# Patient Record
Sex: FEMALE | Race: WHITE | NOT HISPANIC OR LATINO | URBAN - METROPOLITAN AREA
[De-identification: names, ages, dates, MRNs, and addresses within clinical notes are randomized per-mention and may not be internally consistent; named-entity substitution may affect disease eponyms.]

---

## 2017-08-05 ENCOUNTER — EMERGENCY (EMERGENCY)
Facility: HOSPITAL | Age: 82
LOS: 1 days | Discharge: HOME | End: 2017-08-05
Admitting: EMERGENCY MEDICINE
Payer: MEDICARE

## 2017-08-05 ENCOUNTER — INPATIENT (INPATIENT)
Facility: HOSPITAL | Age: 82
LOS: 4 days | Discharge: HOME | DRG: 441 | End: 2017-08-10
Attending: INTERNAL MEDICINE | Admitting: INTERNAL MEDICINE
Payer: MEDICARE

## 2017-08-05 VITALS
OXYGEN SATURATION: 98 % | HEART RATE: 94 BPM | DIASTOLIC BLOOD PRESSURE: 63 MMHG | SYSTOLIC BLOOD PRESSURE: 104 MMHG | RESPIRATION RATE: 16 BRPM

## 2017-08-05 DIAGNOSIS — R17 UNSPECIFIED JAUNDICE: ICD-10-CM

## 2017-08-05 LAB
APPEARANCE UR: ABNORMAL
BACTERIA # UR AUTO: ABNORMAL /HPF
BILIRUB UR-MCNC: ABNORMAL
COLOR SPEC: YELLOW — SIGNIFICANT CHANGE UP
COMMENT - URINE: SIGNIFICANT CHANGE UP
DIFF PNL FLD: ABNORMAL
EPI CELLS # UR: SIGNIFICANT CHANGE UP /HPF
GLUCOSE UR QL: NEGATIVE — SIGNIFICANT CHANGE UP
KETONES UR-MCNC: NEGATIVE — SIGNIFICANT CHANGE UP
LEUKOCYTE ESTERASE UR-ACNC: ABNORMAL
NITRITE UR-MCNC: NEGATIVE — SIGNIFICANT CHANGE UP
PH UR: 6.5 — SIGNIFICANT CHANGE UP (ref 5–8)
PROT UR-MCNC: 30 MG/DL
RBC CASTS # UR COMP ASSIST: ABNORMAL /HPF (ref 0–2)
SP GR SPEC: >1.03 — HIGH (ref 1.01–1.02)
UROBILINOGEN FLD QL: 1
WBC UR QL: SIGNIFICANT CHANGE UP /HPF (ref 0–5)

## 2017-08-05 PROCEDURE — 85027 COMPLETE CBC AUTOMATED: CPT

## 2017-08-05 PROCEDURE — 80053 COMPREHEN METABOLIC PANEL: CPT

## 2017-08-05 PROCEDURE — 82550 ASSAY OF CK (CPK): CPT

## 2017-08-05 PROCEDURE — 99285 EMERGENCY DEPT VISIT HI MDM: CPT | Mod: 25

## 2017-08-05 PROCEDURE — 85610 PROTHROMBIN TIME: CPT

## 2017-08-05 PROCEDURE — 96360 HYDRATION IV INFUSION INIT: CPT | Mod: XU

## 2017-08-05 PROCEDURE — 82150 ASSAY OF AMYLASE: CPT

## 2017-08-05 PROCEDURE — 99285 EMERGENCY DEPT VISIT HI MDM: CPT

## 2017-08-05 PROCEDURE — 85730 THROMBOPLASTIN TIME PARTIAL: CPT

## 2017-08-05 PROCEDURE — 74177 CT ABD & PELVIS W/CONTRAST: CPT

## 2017-08-05 PROCEDURE — 86077 PHYS BLOOD BANK SERV XMATCH: CPT

## 2017-08-05 PROCEDURE — 71045 X-RAY EXAM CHEST 1 VIEW: CPT

## 2017-08-05 PROCEDURE — 71010: CPT | Mod: 26

## 2017-08-05 PROCEDURE — 93010 ELECTROCARDIOGRAM REPORT: CPT

## 2017-08-05 PROCEDURE — 74177 CT ABD & PELVIS W/CONTRAST: CPT | Mod: 26

## 2017-08-05 PROCEDURE — 84484 ASSAY OF TROPONIN QUANT: CPT

## 2017-08-05 PROCEDURE — 96361 HYDRATE IV INFUSION ADD-ON: CPT

## 2017-08-05 PROCEDURE — 82140 ASSAY OF AMMONIA: CPT

## 2017-08-05 PROCEDURE — 82553 CREATINE MB FRACTION: CPT

## 2017-08-05 PROCEDURE — 93005 ELECTROCARDIOGRAM TRACING: CPT

## 2017-08-05 PROCEDURE — 83690 ASSAY OF LIPASE: CPT

## 2017-08-05 RX ORDER — ENOXAPARIN SODIUM 100 MG/ML
40 INJECTION SUBCUTANEOUS DAILY
Qty: 0 | Refills: 0 | Status: DISCONTINUED | OUTPATIENT
Start: 2017-08-05 | End: 2017-08-05

## 2017-08-05 RX ORDER — CEFTRIAXONE 500 MG/1
1 INJECTION, POWDER, FOR SOLUTION INTRAMUSCULAR; INTRAVENOUS ONCE
Qty: 0 | Refills: 0 | Status: COMPLETED | OUTPATIENT
Start: 2017-08-05 | End: 2017-08-05

## 2017-08-05 RX ORDER — GABAPENTIN 400 MG/1
600 CAPSULE ORAL THREE TIMES A DAY
Qty: 0 | Refills: 0 | Status: DISCONTINUED | OUTPATIENT
Start: 2017-08-05 | End: 2017-08-10

## 2017-08-05 RX ORDER — HYDROMORPHONE HYDROCHLORIDE 2 MG/ML
0.5 INJECTION INTRAMUSCULAR; INTRAVENOUS; SUBCUTANEOUS THREE TIMES A DAY
Qty: 0 | Refills: 0 | Status: DISCONTINUED | OUTPATIENT
Start: 2017-08-05 | End: 2017-08-10

## 2017-08-05 RX ORDER — METOPROLOL TARTRATE 50 MG
25 TABLET ORAL
Qty: 0 | Refills: 0 | Status: DISCONTINUED | OUTPATIENT
Start: 2017-08-05 | End: 2017-08-10

## 2017-08-05 RX ORDER — ENOXAPARIN SODIUM 100 MG/ML
80 INJECTION SUBCUTANEOUS
Qty: 0 | Refills: 0 | Status: DISCONTINUED | OUTPATIENT
Start: 2017-08-06 | End: 2017-08-06

## 2017-08-05 RX ORDER — VALSARTAN 80 MG/1
40 TABLET ORAL DAILY
Qty: 0 | Refills: 0 | Status: DISCONTINUED | OUTPATIENT
Start: 2017-08-05 | End: 2017-08-10

## 2017-08-05 RX ORDER — SODIUM CHLORIDE 9 MG/ML
1000 INJECTION INTRAMUSCULAR; INTRAVENOUS; SUBCUTANEOUS
Qty: 0 | Refills: 0 | Status: DISCONTINUED | OUTPATIENT
Start: 2017-08-05 | End: 2017-08-06

## 2017-08-05 RX ORDER — PRAMIPEXOLE DIHYDROCHLORIDE 0.12 MG/1
0.25 TABLET ORAL AT BEDTIME
Qty: 0 | Refills: 0 | Status: DISCONTINUED | OUTPATIENT
Start: 2017-08-05 | End: 2017-08-10

## 2017-08-05 RX ADMIN — GABAPENTIN 600 MILLIGRAM(S): 400 CAPSULE ORAL at 23:36

## 2017-08-05 RX ADMIN — PRAMIPEXOLE DIHYDROCHLORIDE 0.25 MILLIGRAM(S): 0.12 TABLET ORAL at 23:36

## 2017-08-05 RX ADMIN — CEFTRIAXONE 100 GRAM(S): 500 INJECTION, POWDER, FOR SOLUTION INTRAMUSCULAR; INTRAVENOUS at 20:40

## 2017-08-05 RX ADMIN — SODIUM CHLORIDE 125 MILLILITER(S): 9 INJECTION INTRAMUSCULAR; INTRAVENOUS; SUBCUTANEOUS at 20:40

## 2017-08-05 RX ADMIN — SODIUM CHLORIDE 125 MILLILITER(S): 9 INJECTION INTRAMUSCULAR; INTRAVENOUS; SUBCUTANEOUS at 17:57

## 2017-08-05 NOTE — ED PROVIDER NOTE - NS ED ROS FT
Constitutional: No fever or chills  Eyes: No visual changes  HEENT: No throat pain  CV: No chest pain  Resp: No SOB no cough  GI: No abd pain, nausea or vomiting + diarrhea  : No dysuria  MSK: No musculoskeletal pain  Skin: + jaundice  Neuro: No headache + diffuse weakness.

## 2017-08-05 NOTE — CONSULT NOTE ADULT - ASSESSMENT
pt with chronic a.fib increase hr secondary undelying consition.  tele  will adjust meds.  tele  gi w/u  echo

## 2017-08-05 NOTE — ED PROVIDER NOTE - MEDICAL DECISION MAKING DETAILS
88F hx htn afib on pradaxa chf breast ca and lymphoma on oral chemo presents to the ED as a transfer from [place] for jaundice and diarrhea. Pt has had jaundice/diarrhea x 3 weeks. waited until today because  was sick and passed away yesterday. 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 transferred to see GI. Pt denies any pain at this time. Will obtain GI and admit for further work up. Avery Pink M.D., Attending Physician

## 2017-08-05 NOTE — H&P ADULT - NSHPPHYSICALEXAM_GEN_ALL_CORE
PHYSICAL EXAMINATION:  Vital Signs Last 24 Hrs  T(C): 36.4 (05 Aug 2017 17:00), Max: 36.4 (05 Aug 2017 17:00)  T(F): 97.5 (05 Aug 2017 17:00), Max: 97.5 (05 Aug 2017 17:00)  HR: 90 (05 Aug 2017 17:00) (90 - 94)  BP: 103/69 (05 Aug 2017 17:00) (103/69 - 104/63)  BP(mean): --  RR: 16 (05 Aug 2017 17:00) (16 - 16)  SpO2: 98% (05 Aug 2017 17:00) (98% - 98%)  CAPILLARY BLOOD GLUCOSE            GENERAL: NAD, well-groomed, well-developed  HEAD:  atraumatic, normocephalic  EYES: sclera anicteric  ENMT: mucous membranes moist  NECK: supple, No JVD  CHEST/LUNG: clear to auscultation bilaterally; no rales, rhonchi, or wheezing b/l  HEART: normal S1, S2  ABDOMEN: BS+, soft, ND, NT   EXTREMITIES:  pulses palpable; no clubbing, cyanosis, or edema b/l LEs  NEURO: awake, alert, interactive; moves all extremities  SKIN: no rashes or lesions

## 2017-08-05 NOTE — ED ADULT NURSE NOTE - OBJECTIVE STATEMENT
89 y/o female BIB EMS,  transfer from [place] for jaundice and diarrhea  x 3 weeks.  Pt waited until today because her  was sick and passed away yesterday. Pt seen at [facility].  Pt denies any pain at this time.  Pt denies fever, chills, vomiting, chest pain.  Respiration easy and non labored.  Skin warm and dry.

## 2017-08-05 NOTE — ED PROVIDER NOTE - PHYSICAL EXAMINATION
Constitutional: mild distress AAOx3  Eyes: PERRLA EOMI  Head: Normocephalic atraumatic  Mouth: dry mucous membranes  Cardiac: regular rate   Resp: Lungs CTAB  GI: Abd s/nt/nd + splenomegaly  Neuro: CN2-12 intact  Skin: No rashes + jaundice and scleral icteris

## 2017-08-05 NOTE — H&P ADULT - ASSESSMENT
PT  TRANSFERRED  FOR   JAUNDICE,  H/O  CA  BREAST, ,     CT  ABDOMEN,  RESULTS  PENDING,  DVT  PPX   NEEDS  MRCP/  GI PT  TRANSFERRED  FOR   JAUNDICE,  H/O  CA  BREAST, ,     CT  ABDOMEN,  RESULTS , with  biliary  nd d  pancreatic ductal dilatation, ,  DVT  PPX   NEEDS  MRCP/  GI,  hold  pradaxa,  lovenox  to  tart  in  am  for  dvt  ppx

## 2017-08-05 NOTE — CONSULT NOTE ADULT - SUBJECTIVE AND OBJECTIVE BOX
CHIEF COMPLAINT:Patient is a 88y old  Female who presents with a chief complaint of jaundice.consulted for a.fib with rvr.    HPI:· 88F hx htn afib on pradaxa chf breast ca and lymphoma on oral chemo presents to the ED as a transfer from [place] for jaundice and diarrhea. Pt has had jaundice/diarrhea x 3 weeks. waited until today because  was sick and passed away yesterday. 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 transferred to see GI. Pt denies any pain at this time.  pt denies of any chest pain.      PAST MEDICAL & SURGICAL HISTORY:      MEDICATIONS  (STANDING):  sodium chloride 0.9%. 1000 milliLiter(s) (125 mL/Hr) IV Continuous <Continuous>    MEDICATIONS  (PRN):      FAMILY HISTORY:      SOCIAL HISTORY:    [ ] Non-smoker  [ ] Smoker  [ ] Alcohol    Allergies    penicillins (Rash)    Intolerances    	    REVIEW OF SYSTEMS:  CONSTITUTIONAL: No fever, weight loss, + fatigue  EYES: No eye pain, visual disturbances, or discharge,+ jaundice  ENT:  No difficulty hearing, tinnitus, vertigo; No sinus or throat pain  NECK: No pain or stiffness  RESPIRATORY: No cough, wheezing, chills or hemoptysis; + exertional Shortness of Breath  CARDIOVASCULAR: No chest pain, palpitations, passing out, dizziness, or leg swelling  GASTROINTESTINAL: No abdominal or epigastric pain. No nausea, vomiting, or hematemesis; No diarrhea or constipation. No melena or hematochezia.  GENITOURINARY: No dysuria, frequency, hematuria, or incontinence  NEUROLOGICAL: No headaches, memory loss, loss of strength, numbness, or tremors  SKIN: No itching, burning, rashes, or lesions   LYMPH Nodes: No enlarged glands  ENDOCRINE: No heat or cold intolerance; No hair loss  MUSCULOSKELETAL: No joint pain or swelling; No muscle, back, or extremity pain  PSYCHIATRIC: No depression, anxiety, mood swings, or difficulty sleeping  HEME/LYMPH: No easy bruising, or bleeding gums  ALLERGY AND IMMUNOLOGIC: No hives or eczema	    [ ] All others negative	  [ ] Unable to obtain    PHYSICAL EXAM:  T(C): 36.4 (08-05-17 @ 17:00), Max: 36.4 (08-05-17 @ 17:00)  HR: 90 (08-05-17 @ 17:00) (90 - 94)  BP: 103/69 (08-05-17 @ 17:00) (103/69 - 104/63)  RR: 16 (08-05-17 @ 17:00) (16 - 16)  SpO2: 98% (08-05-17 @ 17:00) (98% - 98%)  Wt(kg): --  I&O's Summary      Appearance: Normal	  HEENT:   Normal oral mucosa, PERRL, EOMI, + jaundice	  Lymphatic: No lymphadenopathy  Cardiovascular: Normal S1 S2, No JVD, No murmurs, No edema  Respiratory: Lungs clear to auscultation	  Psychiatry: A & O x 3, Mood & affect appropriate  Gastrointestinal:  Soft, Non-tender, + BS	  Skin: No rashes, No ecchymoses, No cyanosis	  Neurologic: Non-focal  Extremities: Normal range of motion, No clubbing, cyanosis or edema  Vascular: Peripheral pulses palpable 2+ bilaterally    TELEMETRY: 	    ECG:  	  RADIOLOGY:  OTHER: 	  	  LABS:	 	    CARDIAC MARKERS:

## 2017-08-05 NOTE — H&P ADULT - HISTORY OF PRESENT ILLNESS
88F hx htn afib on pradaxa,  chf, ,  seen in [facility] breast ca and lymphoma on oral chemo presents to the ED as a transfer from [place] for jaundice and diarrhea. Pt has had jaundice/diarrhea x 3 weeks. waited until today because  was sick and passed away yesterday. 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 transferred to see GI. Pt denies any pain at this time.,  seen  in  [facility]

## 2017-08-05 NOTE — ED PROVIDER NOTE - OBJECTIVE STATEMENT
88F hx htn afib on pradaxa chf breast ca and lymphoma on oral chemo presents to the ED as a transfer from [place] for jaundice and diarrhea. Pt has had jaundice/diarrhea x 3 weeks. waited until today because  was sick and passed away yesterday. 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 transferred to see GI. Pt denies any pain at this time.

## 2017-08-06 DIAGNOSIS — N39.0 URINARY TRACT INFECTION, SITE NOT SPECIFIED: ICD-10-CM

## 2017-08-06 DIAGNOSIS — R06.00 DYSPNEA, UNSPECIFIED: ICD-10-CM

## 2017-08-06 DIAGNOSIS — R17 UNSPECIFIED JAUNDICE: ICD-10-CM

## 2017-08-06 LAB
ALBUMIN SERPL ELPH-MCNC: 2.9 G/DL — LOW (ref 3.3–5)
ALP SERPL-CCNC: 174 U/L — HIGH (ref 40–120)
ALT FLD-CCNC: 75 U/L — HIGH (ref 10–45)
ANION GAP SERPL CALC-SCNC: 16 MMOL/L — SIGNIFICANT CHANGE UP (ref 5–17)
AST SERPL-CCNC: 112 U/L — HIGH (ref 10–40)
BILIRUB DIRECT SERPL-MCNC: 11 MG/DL — HIGH (ref 0–0.2)
BILIRUB INDIRECT FLD-MCNC: 2.9 MG/DL — HIGH (ref 0.2–1)
BILIRUB SERPL-MCNC: 13.9 MG/DL — HIGH (ref 0.2–1.2)
BLD GP AB SCN SERPL QL: POSITIVE — SIGNIFICANT CHANGE UP
BUN SERPL-MCNC: 19 MG/DL — SIGNIFICANT CHANGE UP (ref 7–23)
CALCIUM SERPL-MCNC: 8 MG/DL — LOW (ref 8.4–10.5)
CHLORIDE SERPL-SCNC: 111 MMOL/L — HIGH (ref 96–108)
CO2 SERPL-SCNC: 11 MMOL/L — LOW (ref 22–31)
CREAT SERPL-MCNC: 0.88 MG/DL — SIGNIFICANT CHANGE UP (ref 0.5–1.3)
CULTURE RESULTS: SIGNIFICANT CHANGE UP
DAT POLY-SP REAG RBC QL: NEGATIVE — SIGNIFICANT CHANGE UP
GLUCOSE SERPL-MCNC: 129 MG/DL — HIGH (ref 70–99)
HCT VFR BLD CALC: 25.2 % — LOW (ref 34.5–45)
HCT VFR BLD CALC: 30.2 % — LOW (ref 34.5–45)
HGB BLD-MCNC: 10 G/DL — LOW (ref 11.5–15.5)
HGB BLD-MCNC: 7.8 G/DL — LOW (ref 11.5–15.5)
MCHC RBC-ENTMCNC: 27.9 PG — SIGNIFICANT CHANGE UP (ref 27–34)
MCHC RBC-ENTMCNC: 31 GM/DL — LOW (ref 32–36)
MCHC RBC-ENTMCNC: 31.1 PG — SIGNIFICANT CHANGE UP (ref 27–34)
MCHC RBC-ENTMCNC: 33.1 GM/DL — SIGNIFICANT CHANGE UP (ref 32–36)
MCV RBC AUTO: 90 FL — SIGNIFICANT CHANGE UP (ref 80–100)
MCV RBC AUTO: 93.9 FL — SIGNIFICANT CHANGE UP (ref 80–100)
NT-PROBNP SERPL-SCNC: 2478 PG/ML — HIGH (ref 0–300)
PLATELET # BLD AUTO: 130 K/UL — LOW (ref 150–400)
PLATELET # BLD AUTO: 138 K/UL — LOW (ref 150–400)
POTASSIUM SERPL-MCNC: 3.8 MMOL/L — SIGNIFICANT CHANGE UP (ref 3.5–5.3)
POTASSIUM SERPL-SCNC: 3.8 MMOL/L — SIGNIFICANT CHANGE UP (ref 3.5–5.3)
PROT SERPL-MCNC: 5.9 G/DL — LOW (ref 6–8.3)
RBC # BLD: 2.8 M/UL — LOW (ref 3.8–5.2)
RBC # BLD: 3.21 M/UL — LOW (ref 3.8–5.2)
RBC # FLD: 19.8 % — HIGH (ref 10.3–14.5)
RBC # FLD: 23 % — HIGH (ref 10.3–14.5)
RH IG SCN BLD-IMP: POSITIVE — SIGNIFICANT CHANGE UP
RH IG SCN BLD-IMP: POSITIVE — SIGNIFICANT CHANGE UP
SODIUM SERPL-SCNC: 138 MMOL/L — SIGNIFICANT CHANGE UP (ref 135–145)
SPECIMEN SOURCE: SIGNIFICANT CHANGE UP
TSH SERPL-MCNC: 3.12 UIU/ML — SIGNIFICANT CHANGE UP (ref 0.27–4.2)
WBC # BLD: 6.79 K/UL — SIGNIFICANT CHANGE UP (ref 3.8–10.5)
WBC # BLD: 9.2 K/UL — SIGNIFICANT CHANGE UP (ref 3.8–10.5)
WBC # FLD AUTO: 6.79 K/UL — SIGNIFICANT CHANGE UP (ref 3.8–10.5)
WBC # FLD AUTO: 9.2 K/UL — SIGNIFICANT CHANGE UP (ref 3.8–10.5)

## 2017-08-06 PROCEDURE — 99222 1ST HOSP IP/OBS MODERATE 55: CPT | Mod: GC

## 2017-08-06 RX ORDER — FUROSEMIDE 40 MG
40 TABLET ORAL DAILY
Qty: 0 | Refills: 0 | Status: DISCONTINUED | OUTPATIENT
Start: 2017-08-06 | End: 2017-08-10

## 2017-08-06 RX ORDER — FUROSEMIDE 40 MG
20 TABLET ORAL DAILY
Qty: 0 | Refills: 0 | Status: DISCONTINUED | OUTPATIENT
Start: 2017-08-06 | End: 2017-08-06

## 2017-08-06 RX ORDER — ENOXAPARIN SODIUM 100 MG/ML
60 INJECTION SUBCUTANEOUS
Qty: 0 | Refills: 0 | Status: DISCONTINUED | OUTPATIENT
Start: 2017-08-06 | End: 2017-08-10

## 2017-08-06 RX ORDER — TOREMIFENE CITRATE 60 MG/1
60 TABLET ORAL DAILY
Qty: 0 | Refills: 0 | Status: DISCONTINUED | OUTPATIENT
Start: 2017-08-06 | End: 2017-08-10

## 2017-08-06 RX ADMIN — GABAPENTIN 600 MILLIGRAM(S): 400 CAPSULE ORAL at 13:12

## 2017-08-06 RX ADMIN — SODIUM CHLORIDE 125 MILLILITER(S): 9 INJECTION INTRAMUSCULAR; INTRAVENOUS; SUBCUTANEOUS at 06:30

## 2017-08-06 RX ADMIN — GABAPENTIN 600 MILLIGRAM(S): 400 CAPSULE ORAL at 06:29

## 2017-08-06 RX ADMIN — GABAPENTIN 600 MILLIGRAM(S): 400 CAPSULE ORAL at 22:00

## 2017-08-06 RX ADMIN — Medication 40 MILLIGRAM(S): at 14:16

## 2017-08-06 RX ADMIN — Medication 5 MILLIGRAM(S): at 06:29

## 2017-08-06 RX ADMIN — Medication 25 MILLIGRAM(S): at 06:29

## 2017-08-06 RX ADMIN — Medication 25 MILLIGRAM(S): at 17:37

## 2017-08-06 RX ADMIN — PRAMIPEXOLE DIHYDROCHLORIDE 0.25 MILLIGRAM(S): 0.12 TABLET ORAL at 22:00

## 2017-08-06 RX ADMIN — ENOXAPARIN SODIUM 60 MILLIGRAM(S): 100 INJECTION SUBCUTANEOUS at 17:37

## 2017-08-06 RX ADMIN — VALSARTAN 40 MILLIGRAM(S): 80 TABLET ORAL at 06:29

## 2017-08-06 NOTE — PROGRESS NOTE ADULT - ASSESSMENT
PT WITH JAUNDICE    CT WITH DUCTAL DILATATION   HOUSE GI TO SEE PT.  H/O OF METASTATIC  CANCER    AWAITING MRCP    ON LOVENOX  BID, NEEDS TO  BE  HELD PRIOR  TO  ERCP

## 2017-08-06 NOTE — CONSULT NOTE ADULT - SUBJECTIVE AND OBJECTIVE BOX
Patient is a 88y old  Female who presents with a chief complaint of jaundice (05 Aug 2017 18:30)    HPI:  88F hx htn afib on pradaxa,  chf, breast ca and lymphoma on oral chemo presents to the ED as a transfer from [place] for jaundice and diarrhea. Pt has had jaundice/diarrhea x 3 weeks. waited until today because  was sick and passed away yesterday. 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 Transferred to see GI. Pt denies any pain at this time.,  seen  in  er (05 Aug 2017 18:30)      PAST MEDICAL & SURGICAL HISTORY:      Social history:  Non smoker    FAMILY HISTORY:  Non contributory    Allergies    penicillins (Rash)    Intolerances        Antimicrobials:    levoFLOXacin  Tablet 250 milliGRAM(s) Oral every 24 hours        Vital Signs Last 24 Hrs  T(C): 36.3 (06 Aug 2017 04:46), Max: 36.9 (05 Aug 2017 22:00)  T(F): 97.4 (06 Aug 2017 04:46), Max: 98.4 (05 Aug 2017 22:00)  HR: 95 (06 Aug 2017 04:46) (88 - 95)  BP: 115/71 (06 Aug 2017 04:46) (103/69 - 116/77)  BP(mean): --  RR: 18 (06 Aug 2017 04:46) (16 - 18)  SpO2: 99% (06 Aug 2017 04:46) (97% - 100%)                              7.8    6.79  )-----------( 138      ( 06 Aug 2017 06:13 )             25.2         08-06    138  |  111<H>  |  19  ----------------------------<  129<H>  3.8   |  11<L>  |  0.88    Ca    8.0<L>      06 Aug 2017 09:02    TPro  5.9<L>  /  Alb  2.9<L>  /  TBili  13.9<H>  /  DBili  x   /  AST  112<H>  /  ALT  75<H>  /  AlkPhos  174<H>  08-06      RECENT CULTURES:    Patient is a 88y old  Female who presents with a chief complaint of jaundice (05 Aug 2017 18:30)    HPI:  88F hx htn afib on pradaxa,  chf, ,  seen in [facility] breast ca and lymphoma on oral chemo presents to the ED as a transfer from [place] for jaundice and diarrhea. Pt has had jaundice/diarrhea x 3 weeks. waited until today because  was sick and passed away yesterday. 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 transferred to see GI. Pt denies any pain at this time.,  seen  in  er (05 Aug 2017 18:30)      PAST MEDICAL & SURGICAL HISTORY:      Social history:    FAMILY HISTORY:    Allergies    penicillins (Rash)    Intolerances        Antimicrobials:    levoFLOXacin  Tablet 250 milliGRAM(s) Oral every 24 hours        Vital Signs Last 24 Hrs  T(C): 36.3 (06 Aug 2017 04:46), Max: 36.9 (05 Aug 2017 22:00)  T(F): 97.4 (06 Aug 2017 04:46), Max: 98.4 (05 Aug 2017 22:00)  HR: 95 (06 Aug 2017 04:46) (88 - 95)  BP: 115/71 (06 Aug 2017 04:46) (103/69 - 116/77)  BP(mean): --  RR: 18 (06 Aug 2017 04:46) (16 - 18)  SpO2: 99% (06 Aug 2017 04:46) (97% - 100%)                              7.8    6.79  )-----------( 138      ( 06 Aug 2017 06:13 )             25.2         08-06    138  |  111<H>  |  19  ----------------------------<  129<H>  3.8   |  11<L>  |  0.88    Ca    8.0<L>      06 Aug 2017 09:02    TPro  5.9<L>  /  Alb  2.9<L>  /  TBili  13.9<H>  /  DBili  x   /  AST  112<H>  /  ALT  75<H>  /  AlkPhos  174<H>  08-06      RECENT CULTURES: Patient is a 88y old  Female who presents with a chief complaint of jaundice (05 Aug 2017 18:30)    HPI:  88F hx htn afib on pradaxa,  chf, breast ca and lymphoma on oral chemo presents to the ED as a transfer from [place] for jaundice and diarrhea. Pt has had jaundice/diarrhea x 3 weeks. Diarrhea has now resolved. Pt waited until yesterday to come to [facility], because  was sick and passed away yesterday. 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 Transferred to see GI and MRCP. Pt denies any pain at this time.     Pt with prior hx of breast cancer on [facility] for ca breast. 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 Details unclear about the status of ca breast. also a year ago was diagnosed to have lymphoma for which she has not received any treatment.     For several months pt reports urinary frequency and incontinence. Denies any fever or chills. No abdominal pain. Since her admission to hospital reports difficulty breathing.       PAST MEDICAL & SURGICAL HISTORY:  Ca breast  Lymphoma  A fib on pradaxa      Social history:  Non smoker    FAMILY HISTORY:  Non contributory    Allergies    penicillins (Rash)    Intolerances        Antimicrobials:    levoFLOXacin  Tablet 250 milliGRAM(s) Oral every 24 hours        Vital Signs Last 24 Hrs  T(C): 36.3 (06 Aug 2017 04:46), Max: 36.9 (05 Aug 2017 22:00)  T(F): 97.4 (06 Aug 2017 04:46), Max: 98.4 (05 Aug 2017 22:00)  HR: 95 (06 Aug 2017 04:46) (88 - 95)  BP: 115/71 (06 Aug 2017 04:46) (103/69 - 116/77)  BP(mean): --  RR: 18 (06 Aug 2017 04:46) (16 - 18)  SpO2: 99% (06 Aug 2017 04:46) (97% - 100%)                              7.8    6.79  )-----------( 138      ( 06 Aug 2017 06:13 )             25.2         08-06    138  |  111<H>  |  19  ----------------------------<  129<H>  3.8   |  11<L>  |  0.88    Ca    8.0<L>      06 Aug 2017 09:02    TPro  5.9<L>  /  Alb  2.9<L>  /  TBili  13.9<H>  /  DBili  x   /  AST  112<H>  /  ALT  75<H>  /  AlkPhos  174<H>  08-06      RECENT CULTURES:  Urine culture: In lab Patient is a 88y old  Female who presents with a chief complaint of jaundice (05 Aug 2017 18:30)    HPI:  88F hx htn afib on pradaxa,  chf, breast ca and lymphoma on oral chemo presents to the ED as a transfer from [place] for jaundice and diarrhea. Pt has had jaundice/diarrhea x 3 weeks. Diarrhea has now resolved. Pt waited until yesterday to come to [facility], because  was sick and passed away more than a week ago. 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 Transferred to see GI and MRCP. Pt denies any pain at this time.     Pt with prior hx of breast cancer on [facility] for ca breast. 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 Details unclear about the status of ca breast. also a year ago was diagnosed to have lymphoma for which she has not received any treatment.     For several months pt reports urinary frequency and incontinence. Denies any fever or chills. No abdominal pain. Since her admission to hospital reports difficulty breathing.       PAST MEDICAL & SURGICAL HISTORY:  Ca breast  Lymphoma  A fib on pradaxa      Social history:  Non smoker    FAMILY HISTORY:  Non contributory    Allergies    penicillins (Rash)    Intolerances        Antimicrobials:    levoFLOXacin  Tablet 250 milliGRAM(s) Oral every 24 hours        Vital Signs Last 24 Hrs  T(C): 36.3 (06 Aug 2017 04:46), Max: 36.9 (05 Aug 2017 22:00)  T(F): 97.4 (06 Aug 2017 04:46), Max: 98.4 (05 Aug 2017 22:00)  HR: 95 (06 Aug 2017 04:46) (88 - 95)  BP: 115/71 (06 Aug 2017 04:46) (103/69 - 116/77)  BP(mean): --  RR: 18 (06 Aug 2017 04:46) (16 - 18)  SpO2: 99% (06 Aug 2017 04:46) (97% - 100%)                              7.8    6.79  )-----------( 138      ( 06 Aug 2017 06:13 )             25.2         08-06    138  |  111<H>  |  19  ----------------------------<  129<H>  3.8   |  11<L>  |  0.88    Ca    8.0<L>      06 Aug 2017 09:02    TPro  5.9<L>  /  Alb  2.9<L>  /  TBili  13.9<H>  /  DBili  x   /  AST  112<H>  /  ALT  75<H>  /  AlkPhos  174<H>  08-06      RECENT CULTURES:  Urine culture: In lab Patient is a 88y old  Female who presents with a chief complaint of jaundice (05 Aug 2017 18:30)    HPI:  88F hx htn afib on pradaxa,  chf, breast ca and lymphoma on oral chemo presents to the ED as a transfer from [place] for jaundice and diarrhea. Pt has had jaundice/diarrhea x 3 weeks. Diarrhea has now resolved. Pt waited until yesterday to come to [facility], because  was sick and passed away more than a week ago. 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 Transferred to see GI and MRCP. Pt denies any pain at this time.     Pt with prior hx of breast cancer on [facility] for ca breast. 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 Details unclear about the status of ca breast. also a year ago was diagnosed to have lymphoma for which she has not received any treatment.     For several months pt reports urinary frequency and incontinence. Denies any fever or chills. No abdominal pain. Since her admission to hospital reports difficulty breathing.       PAST MEDICAL & SURGICAL HISTORY:  Ca breast  Lymphoma  A fib on pradaxa      Social history:  Non smoker    FAMILY HISTORY:  Non contributory    Allergies    penicillins (Rash)    Intolerances        Antimicrobials:    levoFLOXacin  Tablet 250 milliGRAM(s) Oral every 24 hours        Vital Signs Last 24 Hrs  T(C): 36.3 (06 Aug 2017 04:46), Max: 36.9 (05 Aug 2017 22:00)  T(F): 97.4 (06 Aug 2017 04:46), Max: 98.4 (05 Aug 2017 22:00)  HR: 95 (06 Aug 2017 04:46) (88 - 95)  BP: 115/71 (06 Aug 2017 04:46) (103/69 - 116/77)  BP(mean): --  RR: 18 (06 Aug 2017 04:46) (16 - 18)  SpO2: 99% (06 Aug 2017 04:46) (97% - 100%)                              7.8    6.79  )-----------( 138      ( 06 Aug 2017 06:13 )             25.2         08-06    138  |  111<H>  |  19  ----------------------------<  129<H>  3.8   |  11<L>  |  0.88    Ca    8.0<L>      06 Aug 2017 09:02    TPro  5.9<L>  /  Alb  2.9<L>  /  TBili  13.9<H>  /  DBili  x   /  AST  112<H>  /  ALT  75<H>  /  AlkPhos  174<H>  08-06      RECENT CULTURES:  Urine culture: In lab      RADIOLOGY  EXAM: CT ABDOMEN AND PELVIS OC IC  There is moderate cardiomegaly. A right breast implant is noted.  There is moderately severe intra and extrahepatic biliary ductal dilatation.  Common bile duct measures a maximum of roughly 15 mm and is distended to the  ampulla. The gallbladder is also severely distended. The pancreatic duct is  severely distended with moderate pancreatic parenchymal atrophy. There is no  calcified intraductal stone or pancreatic or duodenal mass or surrounding  fat stranding. The stomach is contracted. The hepatic parenchyma contains  several tiny peripheral cysts without enhancing mass lesion.    The spleen is enlarged, measuring approximately 15 cm in length. The  adrenals and appendix are unremarkable. There are tiny renal cysts. There is  no bowel dilatation or ascites or adenopathy or focal inflammation of the  peritoneal fat. Uterus is surgically absent. A pessary is in place. The  urinary bladder is contracted.    Incidental note is made of vertebral plana of L3 with additional chronic  compression fractures elsewhere.    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 Endoscopy suggested. Splenomegaly. Patient is a 88y old  Female who presents with a chief complaint of jaundice (05 Aug 2017 18:30)    HPI:  88F hx htn afib on pradaxa,  chf, breast ca and lymphoma on oral chemo presents to the ED as a transfer from [place] for jaundice and diarrhea. Pt has had jaundice/diarrhea x 3 weeks. Diarrhea has now resolved. Pt waited until yesterday to come to [facility], because  was sick and passed away more than a week ago. 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 Transferred to see GI and MRCP. Pt denies any pain at this time.     Pt with prior hx of breast cancer on [facility] for ca breast. 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 Details unclear about the status of ca breast. also a year ago was diagnosed to have lymphoma for which she has not received any treatment.     For several months pt reports urinary frequency and incontinence. Denies any fever or chills. No abdominal pain. Since her admission to hospital reports difficulty breathing.       PAST MEDICAL & SURGICAL HISTORY:  Ca breast  Lymphoma  A fib on pradaxa      Social history:  Non smoker    FAMILY HISTORY:  Non contributory    Allergies    penicillins (Rash)    Intolerances        Antimicrobials:    levoFLOXacin  Tablet 250 milliGRAM(s) Oral every 24 hours        Vital Signs Last 24 Hrs  T(C): 36.3 (06 Aug 2017 04:46), Max: 36.9 (05 Aug 2017 22:00)  T(F): 97.4 (06 Aug 2017 04:46), Max: 98.4 (05 Aug 2017 22:00)  HR: 95 (06 Aug 2017 04:46) (88 - 95)  BP: 115/71 (06 Aug 2017 04:46) (103/69 - 116/77)  BP(mean): --  RR: 18 (06 Aug 2017 04:46) (16 - 18)  SpO2: 99% (06 Aug 2017 04:46) (97% - 100%)                              7.8    6.79  )-----------( 138      ( 06 Aug 2017 06:13 )             25.2         08-06    138  |  111<H>  |  19  ----------------------------<  129<H>  3.8   |  11<L>  |  0.88    Ca    8.0<L>      06 Aug 2017 09:02    TPro  5.9<L>  /  Alb  2.9<L>  /  TBili  13.9<H>  /  DBili  x   /  AST  112<H>  /  ALT  75<H>  /  AlkPhos  174<H>  08-06      RECENT CULTURES:  Urine culture: In lab      RADIOLOGY  EXAM: CT ABDOMEN AND PELVIS OC IC  There is moderate cardiomegaly. A right breast implant is noted.  There is moderately severe intra and extrahepatic biliary ductal dilatation.  Common bile duct measures a maximum of roughly 15 mm and is distended to the  ampulla. The gallbladder is also severely distended. The pancreatic duct is  severely distended with moderate pancreatic parenchymal atrophy. There is no  calcified intraductal stone or pancreatic or duodenal mass or surrounding  fat stranding. The stomach is contracted. The hepatic parenchyma contains  several tiny peripheral cysts without enhancing mass lesion.    The spleen is enlarged, measuring approximately 15 cm in length. The  adrenals and appendix are unremarkable. There are tiny renal cysts. There is  no bowel dilatation or ascites or adenopathy or focal inflammation of the  peritoneal fat. Uterus is surgically absent. A pessary is in place. The  urinary bladder is contracted.    Incidental note is made of vertebral plana of L3 with additional chronic  compression fractures elsewhere.    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 Endoscopy suggested. Splenomegaly. Patient is a 88y old  Female who presents with a chief complaint of jaundice (05 Aug 2017 18:30)    HPI:  88F hx htn afib on pradaxa,  chf, breast ca on oral chemo and lymphoma ( s/p rituxan in past, stopped 2/2 shingled) presents to the ED as a transfer from [facility] for jaundice and diarrhea. Pt has had jaundice/diarrhea x 3 weeks. Diarrhea has now resolved. Pt waited until yesterday to come for eval, because  was sick and passed away more than a week ago. 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 Transferred to see GI and MRCP. Pt denies any pain at this time.     Pt with prior hx of breast cancer on [facility] for ca breast. Per pt her disease has spread to bones but stable. Unclear about the status of ca breast. Also more than a year ago was diagnosed to have lymphoma for which she has was given rituxan initially which was later discontinued 2/2 shingles.    For several months pt reports urinary frequency and incontinence. Denies any fever or chills. No abdominal pain. Since her admission to hospital reports difficulty breathing.       PAST MEDICAL & SURGICAL HISTORY:  Ca breast  Lymphoma  A fib on pradaxa  CHF      Social history:  Non smoker    FAMILY HISTORY:  Non contributory    Allergies    penicillins (Rash)    Intolerances        Antimicrobials:    levoFLOXacin  Tablet 250 milliGRAM(s) Oral every 24 hours        Vital Signs Last 24 Hrs  T(C): 36.3 (06 Aug 2017 04:46), Max: 36.9 (05 Aug 2017 22:00)  T(F): 97.4 (06 Aug 2017 04:46), Max: 98.4 (05 Aug 2017 22:00)  HR: 95 (06 Aug 2017 04:46) (88 - 95)  BP: 115/71 (06 Aug 2017 04:46) (103/69 - 116/77)  BP(mean): --  RR: 18 (06 Aug 2017 04:46) (16 - 18)  SpO2: 99% (06 Aug 2017 04:46) (97% - 100%)                              7.8    6.79  )-----------( 138      ( 06 Aug 2017 06:13 )             25.2         08-06    138  |  111<H>  |  19  ----------------------------<  129<H>  3.8   |  11<L>  |  0.88    Ca    8.0<L>      06 Aug 2017 09:02    TPro  5.9<L>  /  Alb  2.9<L>  /  TBili  13.9<H>  /  DBili  x   /  AST  112<H>  /  ALT  75<H>  /  AlkPhos  174<H>  08-06      RECENT CULTURES:  Urine culture: In lab      RADIOLOGY  EXAM: CT ABDOMEN AND PELVIS OC IC  There is moderate cardiomegaly. A right breast implant is noted.  There is moderately severe intra and extrahepatic biliary ductal dilatation.  Common bile duct measures a maximum of roughly 15 mm and is distended to the  ampulla. The gallbladder is also severely distended. The pancreatic duct is  severely distended with moderate pancreatic parenchymal atrophy. There is no  calcified intraductal stone or pancreatic or duodenal mass or surrounding  fat stranding. The stomach is contracted. The hepatic parenchyma contains  several tiny peripheral cysts without enhancing mass lesion.    The spleen is enlarged, measuring approximately 15 cm in length. The  adrenals and appendix are unremarkable. There are tiny renal cysts. There is  no bowel dilatation or ascites or adenopathy or focal inflammation of the  peritoneal fat. Uterus is surgically absent. A pessary is in place. The  urinary bladder is contracted.    Incidental note is made of vertebral plana of L3 with additional chronic  compression fractures elsewhere.    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 Endoscopy suggested. Splenomegaly. Patient is a 88y old  Female who presents with a chief complaint of jaundice (05 Aug 2017 18:30)    HPI:  88F hx htn afib on pradaxa,  chf, breast ca on oral chemo and lymphoma ( s/p rituxan in past, stopped 2/2 shingles) presents to the ED as a transfer from [place] for jaundice and diarrhea. Pt has had jaundice/diarrhea x 3 weeks. Diarrhea has now resolved. Pt waited until yesterday to come for eval, because  was sick and passed away more than a week ago. 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 Transferred to see GI and MRCP. Pt denies any pain at this time.     Pt with prior hx of breast cancer on [facility] for ca breast. Per pt her disease has spread to bones but stable. Unclear about the status of ca breast. Also more than a year ago was diagnosed to have lymphoma for which she has was given rituxan initially which was later discontinued 2/2 shingles. Reports lymphoma is also stable.     For several months pt reports urinary frequency and incontinence. Denies any fever or chills. No abdominal pain. Since her admission to hospital reports difficulty breathing.       PAST MEDICAL & SURGICAL HISTORY:  Ca breast  Lymphoma  A fib on pradaxa  CHF      Social history:  Non smoker    FAMILY HISTORY:  Non contributory    Allergies    penicillins (Rash)    Intolerances        Antimicrobials:    levoFLOXacin  Tablet 250 milliGRAM(s) Oral every 24 hours        Vital Signs Last 24 Hrs  T(C): 36.3 (06 Aug 2017 04:46), Max: 36.9 (05 Aug 2017 22:00)  T(F): 97.4 (06 Aug 2017 04:46), Max: 98.4 (05 Aug 2017 22:00)  HR: 95 (06 Aug 2017 04:46) (88 - 95)  BP: 115/71 (06 Aug 2017 04:46) (103/69 - 116/77)  BP(mean): --  RR: 18 (06 Aug 2017 04:46) (16 - 18)  SpO2: 99% (06 Aug 2017 04:46) (97% - 100%)                              7.8    6.79  )-----------( 138      ( 06 Aug 2017 06:13 )             25.2         08-06    138  |  111<H>  |  19  ----------------------------<  129<H>  3.8   |  11<L>  |  0.88    Ca    8.0<L>      06 Aug 2017 09:02    TPro  5.9<L>  /  Alb  2.9<L>  /  TBili  13.9<H>  /  DBili  x   /  AST  112<H>  /  ALT  75<H>  /  AlkPhos  174<H>  08-06      RECENT CULTURES:  Urine culture: In lab      RADIOLOGY  EXAM: CT ABDOMEN AND PELVIS OC IC  There is moderate cardiomegaly. A right breast implant is noted.  There is moderately severe intra and extrahepatic biliary ductal dilatation.  Common bile duct measures a maximum of roughly 15 mm and is distended to the  ampulla. The gallbladder is also severely distended. The pancreatic duct is  severely distended with moderate pancreatic parenchymal atrophy. There is no  calcified intraductal stone or pancreatic or duodenal mass or surrounding  fat stranding. The stomach is contracted. The hepatic parenchyma contains  several tiny peripheral cysts without enhancing mass lesion.    The spleen is enlarged, measuring approximately 15 cm in length. The  adrenals and appendix are unremarkable. There are tiny renal cysts. There is  no bowel dilatation or ascites or adenopathy or focal inflammation of the  peritoneal fat. Uterus is surgically absent. A pessary is in place. The  urinary bladder is contracted.    Incidental note is made of vertebral plana of L3 with additional chronic  compression fractures elsewhere.    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 Endoscopy suggested. Splenomegaly. Patient is a 88y old  Female who presents with a chief complaint of jaundice (05 Aug 2017 18:30)    HPI:  88F hx htn afib on pradaxa,  chf, breast ca on oral chemo and lymphoma ( s/p rituxan in past, stopped 2/2 shingles) presents to the ED as a transfer from [place] for jaundice and diarrhea. Pt has had jaundice/diarrhea x 3 weeks. Diarrhea has now resolved. Pt waited until yesterday to come for eval, because  was sick and passed away more than a week ago. 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 Transferred to see GI and MRCP. Pt denies any pain at this time.     Pt with prior hx of breast cancer ( >40 yrs ago, s/p mastectomy and chemo) now on oral chemo [place]. Per pt her disease has spread to bones but stable. Unclear about the status of ca breast. Also more than a year ago was diagnosed to have lymphoma for which she has was given rituxan initially which was later discontinued 2/2 shingles. Reports lymphoma is also stable.     For several months pt reports urinary frequency and incontinence. Denies any fever or chills. No abdominal pain. Since her admission to hospital reports difficulty breathing.       PAST MEDICAL & SURGICAL HISTORY:  Ca breast  Lymphoma  A fib on pradaxa  CHF      Social history:  Non smoker    FAMILY HISTORY:  Non contributory    Allergies    penicillins (Rash)    Intolerances        Antimicrobials:    levoFLOXacin  Tablet 250 milliGRAM(s) Oral every 24 hours        Vital Signs Last 24 Hrs  T(C): 36.3 (06 Aug 2017 04:46), Max: 36.9 (05 Aug 2017 22:00)  T(F): 97.4 (06 Aug 2017 04:46), Max: 98.4 (05 Aug 2017 22:00)  HR: 95 (06 Aug 2017 04:46) (88 - 95)  BP: 115/71 (06 Aug 2017 04:46) (103/69 - 116/77)  BP(mean): --  RR: 18 (06 Aug 2017 04:46) (16 - 18)  SpO2: 99% (06 Aug 2017 04:46) (97% - 100%)                              7.8    6.79  )-----------( 138      ( 06 Aug 2017 06:13 )             25.2         08-06    138  |  111<H>  |  19  ----------------------------<  129<H>  3.8   |  11<L>  |  0.88    Ca    8.0<L>      06 Aug 2017 09:02    TPro  5.9<L>  /  Alb  2.9<L>  /  TBili  13.9<H>  /  DBili  x   /  AST  112<H>  /  ALT  75<H>  /  AlkPhos  174<H>  08-06      RECENT CULTURES:  Urine culture: In lab      RADIOLOGY  EXAM: CT ABDOMEN AND PELVIS OC IC  There is moderate cardiomegaly. A right breast implant is noted.  There is moderately severe intra and extrahepatic biliary ductal dilatation.  Common bile duct measures a maximum of roughly 15 mm and is distended to the  ampulla. The gallbladder is also severely distended. The pancreatic duct is  severely distended with moderate pancreatic parenchymal atrophy. There is no  calcified intraductal stone or pancreatic or duodenal mass or surrounding  fat stranding. The stomach is contracted. The hepatic parenchyma contains  several tiny peripheral cysts without enhancing mass lesion.    The spleen is enlarged, measuring approximately 15 cm in length. The  adrenals and appendix are unremarkable. There are tiny renal cysts. There is  no bowel dilatation or ascites or adenopathy or focal inflammation of the  peritoneal fat. Uterus is surgically absent. A pessary is in place. The  urinary bladder is contracted.    Incidental note is made of vertebral plana of L3 with additional chronic  compression fractures elsewhere.    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 Endoscopy suggested. Splenomegaly.

## 2017-08-06 NOTE — CONSULT NOTE ADULT - ATTENDING COMMENTS
Patient seen and examined  Above verified   No s/s clinically of UTI  jaundice,ampullary stricture ?Ca  Will defer to primary team  Observe off antimicrobials  Tailor plan per results,course  Case d/w Dr hodge  Will Follow.  Beeper [id number]-slaa/afterhours/No response-[id number]

## 2017-08-06 NOTE — PROGRESS NOTE ADULT - SUBJECTIVE AND OBJECTIVE BOX
SUBJECTIVE / OVERNIGHT EVENTS: No nausea, vomiting or diarrhea, no fever or chills, no dizziness or chest pain, no dysuria or hematuria .      CAPILLARY BLOOD GLUCOSE        I&O's Summary    05 Aug 2017 07:01  -  06 Aug 2017 07:00  --------------------------------------------------------  IN: 875 mL / OUT: 0 mL / NET: 875 mL        PHYSICAL EXAM:  jaundice  HEAD:  Atraumatic, Normocephalic  EYES: EOMI, PERRLA, conjunctiva and sclera clear  NECK: Supple, No JVD  CHEST/LUNG: Clear to auscultation bilaterally; No wheeze  HEART: Regular rate and rhythm; No murmurs, rubs, or gallops  ABDOMEN: Soft, Nontender, Nondistended; Bowel sounds present  EXTREMITIES:  2+ Peripheral Pulses, No clubbing, cyanosis, or edema  PSYCH: AAOx3  NEUROLOGY: non-focal  SKIN: No rashes or lesions    MEDICATIONS  (STANDING):  sodium chloride 0.9%. 1000 milliLiter(s) (125 mL/Hr) IV Continuous <Continuous>  enoxaparin Injectable 80 milliGRAM(s) SubCutaneous two times a day  metoprolol 25 milliGRAM(s) Oral two times a day  valsartan 40 milliGRAM(s) Oral daily  gabapentin 600 milliGRAM(s) Oral three times a day  torsemide 5 milliGRAM(s) Oral daily  pramipexole 0.25 milliGRAM(s) Oral at bedtime  levoFLOXacin  Tablet 250 milliGRAM(s) Oral every 24 hours    MEDICATIONS  (PRN):  HYDROmorphone  Injectable 0.5 milliGRAM(s) IV Push three times a day PRN Moderate Pain (4 - 6)      LABS:                        7.8    6.79  )-----------( 138      ( 06 Aug 2017 06:13 )             25.2                 Urinalysis Basic - ( 05 Aug 2017 19:39 )    Color: Yellow / Appearance: x / SG: >1.030 / pH: x  Gluc: x / Ketone: Negative  / Bili: Large / Urobili: 1   Blood: x / Protein: 30 mg/dL / Nitrite: Negative   Leuk Esterase: Large / RBC: 5-10 /HPF / WBC 26-50 /HPF   Sq Epi: x / Non Sq Epi: Few /HPF / Bacteria: Few /HPF                  Cultures:    EKG:    Radiological Studies:    Consultant(s) Notes Reviewed:      Care Discussed with Consultants/Other Providers:

## 2017-08-06 NOTE — CONSULT NOTE ADULT - PROBLEM SELECTOR RECOMMENDATION 9
-Although with positive UA, pt reports no dysuria and burning. Pt with chronic incontinence and frequency  -Fu on urine culture  -Would hold off on abx -Although with pyuria, pt reports no dysuria and burning. Pt with chronic incontinence and frequency  -Fu on urine culture  -Would hold off on abx

## 2017-08-06 NOTE — PROGRESS NOTE ADULT - SUBJECTIVE AND OBJECTIVE BOX
CARDIOLOGY     PROGRESS  NOTE   ________________________________________________    CHIEF COMPLAINT:Patient is a 88y old  Female who presents with a chief complaint of jaundice (05 Aug 2017 18:30)  layiong comfortably in nop acute distress.    	  REVIEW OF SYSTEMS:  CONSTITUTIONAL: No fever, weight loss, or fatigue,jaundice  EYES: No eye pain, visual disturbances, or discharge  ENT:  No difficulty hearing, tinnitus, vertigo; No sinus or throat pain  NECK: No pain or stiffness  RESPIRATORY: No cough, wheezing, chills or hemoptysis; No Shortness of Breath  CARDIOVASCULAR: No chest pain, palpitations, passing out, dizziness, or leg swelling  GASTROINTESTINAL: No abdominal or epigastric pain. No nausea, vomiting, or hematemesis; No diarrhea or constipation. No melena or hematochezia.  GENITOURINARY: No dysuria, frequency, hematuria, or incontinence  NEUROLOGICAL: No headaches, memory loss, loss of strength, numbness, or tremors  SKIN: No itching, burning, rashes, or lesions   LYMPH Nodes: No enlarged glands  ENDOCRINE: No heat or cold intolerance; No hair loss  MUSCULOSKELETAL: No joint pain or swelling; No muscle, back, or extremity pain  PSYCHIATRIC: No depression, anxiety, mood swings, or difficulty sleeping  HEME/LYMPH: No easy bruising, or bleeding gums  ALLERGY AND IMMUNOLOGIC: No hives or eczema	    [ ] All others negative	  [ ] Unable to obtain    PHYSICAL EXAM:  T(C): 36.3 (08-06-17 @ 04:46), Max: 36.9 (08-05-17 @ 22:00)  HR: 95 (08-06-17 @ 04:46) (88 - 95)  BP: 115/71 (08-06-17 @ 04:46) (103/69 - 116/77)  RR: 18 (08-06-17 @ 04:46) (16 - 18)  SpO2: 99% (08-06-17 @ 04:46) (97% - 100%)  Wt(kg): --  I&O's Summary    05 Aug 2017 07:01  -  06 Aug 2017 07:00  --------------------------------------------------------  IN: 875 mL / OUT: 0 mL / NET: 875 mL        Appearance: Normal	  HEENT:   Normal oral mucosa, PERRL, EOMI,jaundice	  Lymphatic: No lymphadenopathy  Cardiovascular: Normal S1 S2, increasem  JVD, No murmurs, No edema  Respiratory: Lungs clear to auscultation	  Psychiatry: A & O x 3, Mood & affect appropriate  Gastrointestinal:  Soft, Non-tender, + BS	  Skin: No rashes, No ecchymoses, No cyanosis	  Neurologic: Non-focal  Extremities: Normal range of motion, No clubbing, cyanosis or edema  Vascular: Peripheral pulses palpable 2+ bilaterally    MEDICATIONS  (STANDING):  enoxaparin Injectable 80 milliGRAM(s) SubCutaneous two times a day  metoprolol 25 milliGRAM(s) Oral two times a day  valsartan 40 milliGRAM(s) Oral daily  gabapentin 600 milliGRAM(s) Oral three times a day  torsemide 5 milliGRAM(s) Oral daily  pramipexole 0.25 milliGRAM(s) Oral at bedtime  levoFLOXacin  Tablet 250 milliGRAM(s) Oral every 24 hours      TELEMETRY: 	    ECG:  	  RADIOLOGY:  OTHER: 	  	  LABS:	 	    CARDIAC MARKERS:                                7.8    6.79  )-----------( 138      ( 06 Aug 2017 06:13 )             25.2           proBNP:   Lipid Profile:   HgA1c:   TSH:         Assessment and plan  ---------------------------  pt with hx of chf on cardiac meds with jaundice.  awaiting gi w/u  will discuss cardiac status with her cardiologist in [place]  continue meds  awaiting echo  lasix

## 2017-08-06 NOTE — CONSULT NOTE ADULT - ASSESSMENT
1. 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 Afib on a/c    - Will review imaging with Biliary attending tomorrow  - Diet as tolerated  - Appreciate cardiology recs, awaiting TTE  - Tentatively plan for EUS/ERCP Tuesday pending above

## 2017-08-06 NOTE — CONSULT NOTE ADULT - ASSESSMENT
88F hx htn afib on pradaxa,  chf, breast ca and lymphoma on oral chemo presents to the ED as a transfer from [place] for jaundice and diarrhea. 88F hx htn afib on pradaxa,  chf, breast ca and lymphoma on oral chemo presents to the ED as a transfer from [place] for jaundice and diarrhea. Pt with hx of ? metastatic breast cancer. Imaging studies at [place] suggestive of severe biliary dilatation, pancreatic duct dilation s/o stricture of ampulla. Pt with chronic urinary frequency and incontinence w/o dysuria or burning. 88F hx htn afib on pradaxa,  chf, breast ca and lymphoma on oral chemo presents to the ED as a transfer from [place] for jaundice and diarrhea. Pt with hx of ? metastatic breast cancer. Imaging studies( Ct abdomen/pelvis) at [place] suggestive of severe biliary dilatation, pancreatic duct dilation s/o stricture of ampulla. Pt with chronic urinary frequency and incontinence w/o dysuria or burning.

## 2017-08-06 NOTE — CONSULT NOTE ADULT - SUBJECTIVE AND OBJECTIVE BOX
Chief Complaint:  Patient is a 88y old  Female who presents with a chief complaint of jaundice (05 Aug 2017 18:30)      HPI:    Allergies:  penicillins (Rash)      Home Medications:    Hospital Medications:  metoprolol 25 milliGRAM(s) Oral two times a day  valsartan 40 milliGRAM(s) Oral daily  gabapentin 600 milliGRAM(s) Oral three times a day  pramipexole 0.25 milliGRAM(s) Oral at bedtime  HYDROmorphone  Injectable 0.5 milliGRAM(s) IV Push three times a day PRN  levoFLOXacin  Tablet 250 milliGRAM(s) Oral every 24 hours  enoxaparin Injectable 60 milliGRAM(s) SubCutaneous two times a day  furosemide   Injectable 40 milliGRAM(s) IV Push daily      PMHX/PSHX:      Family history:      Social History:     Review of systems: Negative, except as otherwise noted above      PHYSICAL EXAM:   Vital Signs:  Vital Signs Last 24 Hrs  T(C): 36.3 (06 Aug 2017 04:46), Max: 36.9 (05 Aug 2017 22:00)  T(F): 97.4 (06 Aug 2017 04:46), Max: 98.4 (05 Aug 2017 22:00)  HR: 95 (06 Aug 2017 04:46) (88 - 95)  BP: 115/71 (06 Aug 2017 04:46) (103/69 - 116/77)  BP(mean): --  RR: 18 (06 Aug 2017 04:46) (16 - 18)  SpO2: 99% (06 Aug 2017 04:46) (97% - 100%)  Daily Height in cm: 160.02 (05 Aug 2017 22:00)    Daily     GENERAL:  No acute distress  HEENT:  Anicteric, no thrush  CHEST:  Non-labored breathing, lungs clear b/l  HEART:  +s1, s2 heart sounds, no murmurs  ABDOMEN:    EXTREMITIES:  warm and well perfused, no edema  SKIN:    NEURO:          LABS:  CBC Full  -  ( 06 Aug 2017 06:13 )  WBC Count : 6.79 K/uL  Hemoglobin : 7.8 g/dL  Hematocrit : 25.2 %  Platelet Count - Automated : 138 K/uL  Mean Cell Volume : 90.0 fl  Auto Neutrophil # :   Auto Neutrophil % :     08-06 @ 09:02  Na 138 mmol/L  K 3.8 mmol/L  Cl 111 mmol/L  CO2 11 mmol/L  BUN 19 mg/dL  Creat 0.88 mg/dL  Glucose 129 mg/dL  Ca 8.0 mg/dL    Total protein 5.9 g/dL  Albumin 2.9 g/dL  T bili 13.9 mg/dL  Alk phos 174 U/L   U/L  ALT 75 U/L          Imaging: Chief Complaint:  Patient is a 88y old  Female who presents with a chief complaint of jaundice (05 Aug 2017 18:30)      HPI: 88F with HTN, AFib on a/c (pradaxa as outpatient), CHF, history of breast ca and lymphoma who presented to [facility] with jaundice x 3 weeks and transferred to [facility] for further evaluation. Patient states was told she was jaundiced approx 3 weeks ago, but waited to come in because her  was sick and ultimately passed away. She reports dark urine and 5 lb weight loss. She otherwise denies abdominal pain, nausea, vomiting, fever, chills. She does report loose stools. She has no family history of gallbladder, liver, or pancreatic disease. She has no recent travel or sick contacts. Per report, patient had cross sectional imaging at [facility] that showed dilated CBD and PD.    Allergies:  penicillins (Rash)      Hospital Medications:  metoprolol 25 milliGRAM(s) Oral two times a day  valsartan 40 milliGRAM(s) Oral daily  gabapentin 600 milliGRAM(s) Oral three times a day  pramipexole 0.25 milliGRAM(s) Oral at bedtime  HYDROmorphone  Injectable 0.5 milliGRAM(s) IV Push three times a day PRN  levoFLOXacin  Tablet 250 milliGRAM(s) Oral every 24 hours  enoxaparin Injectable 60 milliGRAM(s) SubCutaneous two times a day  furosemide   Injectable 40 milliGRAM(s) IV Push daily      PMHX/PSHX:  as above    Family history:  as above    Social History: as above    Review of systems: Negative, except as otherwise noted above      PHYSICAL EXAM:   Vital Signs:  Vital Signs Last 24 Hrs  T(C): 36.3 (06 Aug 2017 04:46), Max: 36.9 (05 Aug 2017 22:00)  T(F): 97.4 (06 Aug 2017 04:46), Max: 98.4 (05 Aug 2017 22:00)  HR: 95 (06 Aug 2017 04:46) (88 - 95)  BP: 115/71 (06 Aug 2017 04:46) (103/69 - 116/77)  BP(mean): --  RR: 18 (06 Aug 2017 04:46) (16 - 18)  SpO2: 99% (06 Aug 2017 04:46) (97% - 100%)  Daily Height in cm: 160.02 (05 Aug 2017 22:00)    Daily     GENERAL:  No acute distress  HEENT:  Icteric, no thrush  CHEST:  Non-labored breathing, scattered rhonchi  HEART:  +s1, s2 heart sounds, no murmurs  ABDOMEN:  Soft, nontender to palpation, no rebound or guarding,   EXTREMITIES:  warm and well perfused, no edema  SKIN:  Jaundiced  NEURO:  Awake following commands no distress        LABS:  CBC Full  -  ( 06 Aug 2017 06:13 )  WBC Count : 6.79 K/uL  Hemoglobin : 7.8 g/dL  Hematocrit : 25.2 %  Platelet Count - Automated : 138 K/uL  Mean Cell Volume : 90.0 fl  Auto Neutrophil # :   Auto Neutrophil % :     08-06 @ 09:02  Na 138 mmol/L  K 3.8 mmol/L  Cl 111 mmol/L  CO2 11 mmol/L  BUN 19 mg/dL  Creat 0.88 mg/dL  Glucose 129 mg/dL  Ca 8.0 mg/dL    Total protein 5.9 g/dL  Albumin 2.9 g/dL  T bili 13.9 mg/dL  Alk phos 174 U/L   U/L  ALT 75 U/L

## 2017-08-06 NOTE — CONSULT NOTE ADULT - SUBJECTIVE AND OBJECTIVE BOX
HPI:  88F hx htn afib on pradaxa,  chf, ,  seen in er breast ca and lymphoma on oral chemo presents to the ED as a transfer from [place] for jaundice and diarrhea. Pt has had jaundice/diarrhea x 3 weeks. waited until today because  was sick and passed away yesterday. Pt seen at [facility] found to have severe biliary and pancreatic ductal dilation implying stricture at the common ampulla. transferred to see GI. Pt denies any pain at this time.,  seen  in  er (05 Aug 2017 18:30)    PAST MEDICAL & SURGICAL HISTORY:    Meds:  metoprolol 25 milliGRAM(s) Oral two times a day  valsartan 40 milliGRAM(s) Oral daily  gabapentin 600 milliGRAM(s) Oral three times a day  pramipexole 0.25 milliGRAM(s) Oral at bedtime  HYDROmorphone  Injectable 0.5 milliGRAM(s) IV Push three times a day PRN Moderate Pain (4 - 6)  levoFLOXacin  Tablet 250 milliGRAM(s) Oral every 24 hours  enoxaparin Injectable 60 milliGRAM(s) SubCutaneous two times a day  furosemide   Injectable 40 milliGRAM(s) IV Push daily    Labs:  CBC Full  -  ( 06 Aug 2017 06:13 )  WBC Count : 6.79 K/uL  Hemoglobin : 7.8 g/dL  Hematocrit : 25.2 %  Platelet Count - Automated : 138 K/uL  Mean Cell Volume : 90.0 fl  Mean Cell Hemoglobin : 27.9 pg  Mean Cell Hemoglobin Concentration : 31.0 gm/dL  Auto Neutrophil # : x  Auto Lymphocyte # : x  Auto Monocyte # : x  Auto Eosinophil # : x  Auto Basophil # : x  Auto Neutrophil % : x  Auto Lymphocyte % : x  Auto Monocyte % : x  Auto Eosinophil % : x  Auto Basophil % : x    08-06    138  |  111<H>  |  19  ----------------------------<  129<H>  3.8   |  11<L>  |  0.88    Ca    8.0<L>      06 Aug 2017 09:02    TPro  5.9<L>  /  Alb  2.9<L>  /  TBili  13.9<H>  /  DBili  x   /  AST  112<H>  /  ALT  75<H>  /  AlkPhos  174<H>  08-06      Radiology:             ROS:  No pain, no fever  No lumps in neck or pain  No Sob or chest pain  No palpitations   No abdominal pain. No change in bowel habit. No blood in stools  No weakness in extremities  No leg swelling      Vital Signs Last 24 Hrs  T(C): 36.3 (06 Aug 2017 04:46), Max: 36.9 (05 Aug 2017 22:00)  T(F): 97.4 (06 Aug 2017 04:46), Max: 98.4 (05 Aug 2017 22:00)  HR: 95 (06 Aug 2017 04:46) (88 - 95)  BP: 115/71 (06 Aug 2017 04:46) (103/69 - 116/77)  BP(mean): --  RR: 18 (06 Aug 2017 04:46) (16 - 18)  SpO2: 99% (06 Aug 2017 04:46) (97% - 100%)    Physical Exam:  Patient comfortable  AXOX3  Neck supple, no LN's  Chest: bilateral breath sounds, no wheeze or rales  CVS: regular heart rate without murmur  Abdomen: soft, BS+, no massess or organomegaly  CNS: no gross deficit  Ext: no edema HPI:  Patient admitted with jaundice and h/ severe severe biliary and pancreatic ductal dilation implying stricture at the common ampulla found in [facility]. Her  just passed away and that was her reason for not seeking medical attention 2 weeks back.   Patient's daughter had records which I reviewed in detail.  Patient has h/o right breast cancer in 1976 and had right mastectomy with reconstruction and CMF. 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 Had shingles during treatment.   Now with 2 weeks of jaundice and transferred from [place] for intervention and diagnosis    PAST MEDICAL & SURGICAL HISTORY:  As above    Meds:  metoprolol 25 milliGRAM(s) Oral two times a day  valsartan 40 milliGRAM(s) Oral daily  gabapentin 600 milliGRAM(s) Oral three times a day  pramipexole 0.25 milliGRAM(s) Oral at bedtime  HYDROmorphone  Injectable 0.5 milliGRAM(s) IV Push three times a day PRN Moderate Pain (4 - 6)  levoFLOXacin  Tablet 250 milliGRAM(s) Oral every 24 hours  enoxaparin Injectable 60 milliGRAM(s) SubCutaneous two times a day  furosemide   Injectable 40 milliGRAM(s) IV Push daily    Labs:  CBC Full  -  ( 06 Aug 2017 06:13 )  WBC Count : 6.79 K/uL  Hemoglobin : 7.8 g/dL  Hematocrit : 25.2 %  Platelet Count - Automated : 138 K/uL  Mean Cell Volume : 90.0 fl  Mean Cell Hemoglobin : 27.9 pg  Mean Cell Hemoglobin Concentration : 31.0 gm/dL  Auto Neutrophil # : x  Auto Lymphocyte # : x  Auto Monocyte # : x  Auto Eosinophil # : x  Auto Basophil # : x  Auto Neutrophil % : x  Auto Lymphocyte % : x  Auto Monocyte % : x  Auto Eosinophil % : x  Auto Basophil % : x    08-06    138  |  111<H>  |  19  ----------------------------<  129<H>  3.8   |  11<L>  |  0.88    Ca    8.0<L>      06 Aug 2017 09:02    TPro  5.9<L>  /  Alb  2.9<L>  /  TBili  13.9<H>  /  DBili  x   /  AST  112<H>  /  ALT  75<H>  /  AlkPhos  174<H>  08-06    EXAM: CT ABDOMEN AND PELVIS OC IC  There is moderate cardiomegaly. A right breast implant is noted.  There is moderately severe intra and extrahepatic biliary ductal dilatation.  Common bile duct measures a maximum of roughly 15 mm and is distended to the  ampulla. The gallbladder is also severely distended. The pancreatic duct is  severely distended with moderate pancreatic parenchymal atrophy. There is no  calcified intraductal stone or pancreatic or duodenal mass or surrounding  fat stranding. The stomach is contracted. The hepatic parenchyma contains  several tiny peripheral cysts without enhancing mass lesion.    The spleen is enlarged, measuring approximately 15 cm in length. The  adrenals and appendix are unremarkable. There are tiny renal cysts. There is  no bowel dilatation or ascites or adenopathy or focal inflammation of the  peritoneal fat. Uterus is surgically absent. A pessary is in place. The  urinary bladder is contracted.    Incidental note is made of vertebral plana of L3 with additional chronic  compression fractures elsewhere.    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 Endoscopy suggested. Splenomegaly.            ROS:  No pain, no fever  No lumps in neck or pain  No Sob or chest pain  No palpitations   No abdominal pain. No change in bowel habit. No blood in stools  No weakness in extremities  No leg swelling      Vital Signs Last 24 Hrs  T(C): 36.3 (06 Aug 2017 04:46), Max: 36.9 (05 Aug 2017 22:00)  T(F): 97.4 (06 Aug 2017 04:46), Max: 98.4 (05 Aug 2017 22:00)  HR: 95 (06 Aug 2017 04:46) (88 - 95)  BP: 115/71 (06 Aug 2017 04:46) (103/69 - 116/77)  BP(mean): --  RR: 18 (06 Aug 2017 04:46) (16 - 18)  SpO2: 99% (06 Aug 2017 04:46) (97% - 100%)    Physical Exam:  Patient comfortable  AXOX3, icterus  Neck supple, no LN's  Chest: bilateral breath sounds, no wheeze or rales  CVS: regular heart rate without murmur  Abdomen: soft, distended but soft, BS+, no massess or organomegaly  CNS: no gross deficit  Ext: no edema, left leg in dressing from bumping and injuring recently HPI:  Patient admitted with jaundice and h/ severe severe biliary and pancreatic ductal dilation implying stricture at the common ampulla found in [facility]. Her  just passed away and that was her reason for not seeking medical attention 2 weeks back.   Patient's daughter had records which I reviewed in detail.  Patient has h/o right breast cancer in 1976 and had right mastectomy with reconstruction and CMF. 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 Had shingles during treatment.   Now with 2 weeks of jaundice and transferred from [place] for intervention and diagnosis    PAST MEDICAL & SURGICAL HISTORY:  As above and Afib    Meds:  metoprolol 25 milliGRAM(s) Oral two times a day  valsartan 40 milliGRAM(s) Oral daily  gabapentin 600 milliGRAM(s) Oral three times a day  pramipexole 0.25 milliGRAM(s) Oral at bedtime  HYDROmorphone  Injectable 0.5 milliGRAM(s) IV Push three times a day PRN Moderate Pain (4 - 6)  levoFLOXacin  Tablet 250 milliGRAM(s) Oral every 24 hours  enoxaparin Injectable 60 milliGRAM(s) SubCutaneous two times a day  furosemide   Injectable 40 milliGRAM(s) IV Push daily    Labs:  CBC Full  -  ( 06 Aug 2017 06:13 )  WBC Count : 6.79 K/uL  Hemoglobin : 7.8 g/dL  Hematocrit : 25.2 %  Platelet Count - Automated : 138 K/uL  Mean Cell Volume : 90.0 fl  Mean Cell Hemoglobin : 27.9 pg  Mean Cell Hemoglobin Concentration : 31.0 gm/dL  Auto Neutrophil # : x  Auto Lymphocyte # : x  Auto Monocyte # : x  Auto Eosinophil # : x  Auto Basophil # : x  Auto Neutrophil % : x  Auto Lymphocyte % : x  Auto Monocyte % : x  Auto Eosinophil % : x  Auto Basophil % : x    08-06    138  |  111<H>  |  19  ----------------------------<  129<H>  3.8   |  11<L>  |  0.88    Ca    8.0<L>      06 Aug 2017 09:02    TPro  5.9<L>  /  Alb  2.9<L>  /  TBili  13.9<H>  /  DBili  x   /  AST  112<H>  /  ALT  75<H>  /  AlkPhos  174<H>  08-06    EXAM: CT ABDOMEN AND PELVIS OC IC  There is moderate cardiomegaly. A right breast implant is noted.  There is moderately severe intra and extrahepatic biliary ductal dilatation.  Common bile duct measures a maximum of roughly 15 mm and is distended to the  ampulla. The gallbladder is also severely distended. The pancreatic duct is  severely distended with moderate pancreatic parenchymal atrophy. There is no  calcified intraductal stone or pancreatic or duodenal mass or surrounding  fat stranding. The stomach is contracted. The hepatic parenchyma contains  several tiny peripheral cysts without enhancing mass lesion.    The spleen is enlarged, measuring approximately 15 cm in length. The  adrenals and appendix are unremarkable. There are tiny renal cysts. There is  no bowel dilatation or ascites or adenopathy or focal inflammation of the  peritoneal fat. Uterus is surgically absent. A pessary is in place. The  urinary bladder is contracted.    Incidental note is made of vertebral plana of L3 with additional chronic  compression fractures elsewhere.    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 Endoscopy suggested. Splenomegaly.            ROS:  No pain, no fever  No lumps in neck or pain  No Sob or chest pain  No palpitations   No abdominal pain. No change in bowel habit. No blood in stools  No weakness in extremities  No leg swelling      Vital Signs Last 24 Hrs  T(C): 36.3 (06 Aug 2017 04:46), Max: 36.9 (05 Aug 2017 22:00)  T(F): 97.4 (06 Aug 2017 04:46), Max: 98.4 (05 Aug 2017 22:00)  HR: 95 (06 Aug 2017 04:46) (88 - 95)  BP: 115/71 (06 Aug 2017 04:46) (103/69 - 116/77)  BP(mean): --  RR: 18 (06 Aug 2017 04:46) (16 - 18)  SpO2: 99% (06 Aug 2017 04:46) (97% - 100%)    Physical Exam:  Patient comfortable  AXOX3, icterus  Neck supple, no LN's  Chest: bilateral breath sounds, no wheeze or rales  CVS: regular heart rate without murmur  Abdomen: soft, distended but soft, BS+, no massess or organomegaly  CNS: no gross deficit  Ext: no edema, left leg in dressing from bumping and injuring recently

## 2017-08-06 NOTE — CONSULT NOTE ADULT - PROBLEM SELECTOR RECOMMENDATION 3
-? CHF -pt used to be on lasix  -To be addressed by primary team  -No pneumonia on CXR from [facility] on 8/5/17

## 2017-08-06 NOTE — CONSULT NOTE ADULT - ASSESSMENT
I discussed the patient's condition and plan of management in full detail with patient's family, specially daughter.  She understands that diagnostic procedure by GI/ IR will be needed and that will help with diagnosis and palliation of jaundice as well.   Once we know the etiology of obstructive jaundice at that time further discussion can be done regarding management keeping mind patient's age.   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   Patient also has h/o breast cancer with bone mets but disease clinically has been stable from her history and present imaging available.  Anemia can be from multiple etiologies including bleeding, deficiency, NHL and malignancy.  PRBC being given. Protein studies, iron profile, B12, LDH and retic ordered

## 2017-08-07 LAB
% ALBUMIN: 52.3 % — SIGNIFICANT CHANGE UP
% ALPHA 1: 7.3 % — SIGNIFICANT CHANGE UP
% ALPHA 2: 12.2 % — SIGNIFICANT CHANGE UP
% BETA: 10.8 % — SIGNIFICANT CHANGE UP
% GAMMA: 17.4 % — SIGNIFICANT CHANGE UP
% M SPIKE: SIGNIFICANT CHANGE UP %
ALBUMIN SERPL ELPH-MCNC: 3 G/DL — LOW (ref 3.3–5)
ALBUMIN SERPL ELPH-MCNC: 3.1 G/DL — LOW (ref 3.6–5.5)
ALBUMIN/GLOB SERPL ELPH: 1.1 RATIO — SIGNIFICANT CHANGE UP
ALP SERPL-CCNC: 199 U/L — HIGH (ref 40–120)
ALPHA1 GLOB SERPL ELPH-MCNC: 0.4 G/DL — SIGNIFICANT CHANGE UP (ref 0.1–0.4)
ALPHA2 GLOB SERPL ELPH-MCNC: 0.7 G/DL — SIGNIFICANT CHANGE UP (ref 0.5–1)
ALT FLD-CCNC: 78 U/L — HIGH (ref 10–45)
ANION GAP SERPL CALC-SCNC: 14 MMOL/L — SIGNIFICANT CHANGE UP (ref 5–17)
AST SERPL-CCNC: 120 U/L — HIGH (ref 10–40)
B-GLOBULIN SERPL ELPH-MCNC: 0.6 G/DL — SIGNIFICANT CHANGE UP (ref 0.5–1)
BILIRUB SERPL-MCNC: 17.8 MG/DL — HIGH (ref 0.2–1.2)
BUN SERPL-MCNC: 20 MG/DL — SIGNIFICANT CHANGE UP (ref 7–23)
CALCIUM SERPL-MCNC: 8.4 MG/DL — SIGNIFICANT CHANGE UP (ref 8.4–10.5)
CHLORIDE SERPL-SCNC: 112 MMOL/L — HIGH (ref 96–108)
CO2 SERPL-SCNC: 15 MMOL/L — LOW (ref 22–31)
CREAT SERPL-MCNC: 1.09 MG/DL — SIGNIFICANT CHANGE UP (ref 0.5–1.3)
FERRITIN SERPL-MCNC: 93 NG/ML — SIGNIFICANT CHANGE UP (ref 15–150)
GAMMA GLOBULIN: 1 G/DL — SIGNIFICANT CHANGE UP (ref 0.6–1.6)
GLUCOSE SERPL-MCNC: 132 MG/DL — HIGH (ref 70–99)
HCT VFR BLD CALC: 28 % — LOW (ref 34.5–45)
HGB BLD-MCNC: 8.9 G/DL — LOW (ref 11.5–15.5)
INTERPRETATION SERPL IFE-IMP: SIGNIFICANT CHANGE UP
IRON SATN MFR SERPL: 171 UG/DL — HIGH (ref 30–160)
IRON SATN MFR SERPL: 56 % — HIGH (ref 14–50)
KAPPA LC SER QL IFE: 3.23 MG/DL — HIGH (ref 0.33–1.94)
KAPPA/LAMBDA FREE LIGHT CHAIN RATIO, SERUM: 1.31 RATIO — SIGNIFICANT CHANGE UP (ref 0.26–1.65)
LAMBDA LC SER QL IFE: 2.47 MG/DL — SIGNIFICANT CHANGE UP (ref 0.57–2.63)
LDH SERPL L TO P-CCNC: 289 U/L — HIGH (ref 50–242)
M-SPIKE: SIGNIFICANT CHANGE UP G/DL (ref 0–0)
MCHC RBC-ENTMCNC: 28.5 PG — SIGNIFICANT CHANGE UP (ref 27–34)
MCHC RBC-ENTMCNC: 31.8 GM/DL — LOW (ref 32–36)
MCV RBC AUTO: 89.7 FL — SIGNIFICANT CHANGE UP (ref 80–100)
PLATELET # BLD AUTO: 144 K/UL — LOW (ref 150–400)
POTASSIUM SERPL-MCNC: 3.8 MMOL/L — SIGNIFICANT CHANGE UP (ref 3.5–5.3)
POTASSIUM SERPL-SCNC: 3.8 MMOL/L — SIGNIFICANT CHANGE UP (ref 3.5–5.3)
PROT PATTERN SERPL ELPH-IMP: SIGNIFICANT CHANGE UP
PROT SERPL-MCNC: 6.2 G/DL — SIGNIFICANT CHANGE UP (ref 6–8.3)
RBC # BLD: 3.12 M/UL — LOW (ref 3.8–5.2)
RBC # BLD: 3.12 M/UL — LOW (ref 3.8–5.2)
RBC # FLD: 22.7 % — HIGH (ref 10.3–14.5)
RETICS #: 111.4 K/UL — SIGNIFICANT CHANGE UP (ref 25–125)
RETICS/RBC NFR: 3.6 % — HIGH (ref 0.5–2.5)
SODIUM SERPL-SCNC: 141 MMOL/L — SIGNIFICANT CHANGE UP (ref 135–145)
TIBC SERPL-MCNC: 306 UG/DL — SIGNIFICANT CHANGE UP (ref 220–430)
UIBC SERPL-MCNC: 135 UG/DL — SIGNIFICANT CHANGE UP (ref 110–370)
VIT B12 SERPL-MCNC: 1152 PG/ML — HIGH (ref 243–894)
WBC # BLD: 6.85 K/UL — SIGNIFICANT CHANGE UP (ref 3.8–10.5)
WBC # FLD AUTO: 6.85 K/UL — SIGNIFICANT CHANGE UP (ref 3.8–10.5)

## 2017-08-07 PROCEDURE — 74183 MRI ABD W/O CNTR FLWD CNTR: CPT | Mod: 26

## 2017-08-07 PROCEDURE — 99232 SBSQ HOSP IP/OBS MODERATE 35: CPT

## 2017-08-07 PROCEDURE — 99222 1ST HOSP IP/OBS MODERATE 55: CPT | Mod: GC

## 2017-08-07 RX ORDER — DIAZEPAM 5 MG
5 TABLET ORAL ONCE
Qty: 0 | Refills: 0 | Status: DISCONTINUED | OUTPATIENT
Start: 2017-08-07 | End: 2017-08-07

## 2017-08-07 RX ADMIN — ENOXAPARIN SODIUM 60 MILLIGRAM(S): 100 INJECTION SUBCUTANEOUS at 18:50

## 2017-08-07 RX ADMIN — Medication 25 MILLIGRAM(S): at 05:31

## 2017-08-07 RX ADMIN — ENOXAPARIN SODIUM 60 MILLIGRAM(S): 100 INJECTION SUBCUTANEOUS at 05:31

## 2017-08-07 RX ADMIN — GABAPENTIN 600 MILLIGRAM(S): 400 CAPSULE ORAL at 22:35

## 2017-08-07 RX ADMIN — Medication 40 MILLIGRAM(S): at 05:31

## 2017-08-07 RX ADMIN — Medication 5 MILLIGRAM(S): at 14:42

## 2017-08-07 RX ADMIN — PRAMIPEXOLE DIHYDROCHLORIDE 0.25 MILLIGRAM(S): 0.12 TABLET ORAL at 22:35

## 2017-08-07 RX ADMIN — VALSARTAN 40 MILLIGRAM(S): 80 TABLET ORAL at 05:31

## 2017-08-07 RX ADMIN — GABAPENTIN 600 MILLIGRAM(S): 400 CAPSULE ORAL at 05:31

## 2017-08-07 RX ADMIN — Medication 25 MILLIGRAM(S): at 18:50

## 2017-08-07 RX ADMIN — TOREMIFENE CITRATE 60 MILLIGRAM(S): 60 TABLET ORAL at 11:59

## 2017-08-07 RX ADMIN — GABAPENTIN 600 MILLIGRAM(S): 400 CAPSULE ORAL at 13:46

## 2017-08-07 NOTE — PROGRESS NOTE ADULT - SUBJECTIVE AND OBJECTIVE BOX
Patient is a 88y old  Female who presents with a chief complaint of jaundice (05 Aug 2017 18:30)    Being followed by ID for ?UTI    Interval history:Feels ok  Denies urinary symptoms  No acute events      ROS:  No cough,SOB,CP  No N/V/D./abd pain  No other complaints      Antimicrobials:    levoFLOXacin  Tablet 250 milliGRAM(s) Oral every 24 hours      Vital Signs Last 24 Hrs  T(C): 37.2 (08-07-17 @ 10:44), Max: 37.2 (08-07-17 @ 10:44)  T(F): 99 (08-07-17 @ 10:44), Max: 99 (08-07-17 @ 10:44)  HR: 85 (08-07-17 @ 10:44) (85 - 104)  BP: 111/77 (08-07-17 @ 10:44) (109/72 - 133/85)  BP(mean): --  RR: 85 (08-07-17 @ 10:44) (18 - 85)  SpO2: 99% (08-07-17 @ 10:44) (98% - 100%)    Physical Exam:    Constitutional well preserved,comfortable,pleasant    HEENT PERRLA EOMI,No pallor + icterus,jaundice    No oral exudate or erythema    Neck supple no JVD or LN    Chest Good AE,CTA    CVS RRR S1 S2 WNl No murmur or rub or gallop    Abd soft BS normal No tenderness no masses    Ext No cyanosis clubbing or edema    IV site no erythema tenderness or discharge    Joints no swelling or LOM    CNS AAO X 3 no focal    Lab Data:                          8.9    6.85  )-----------( 144      ( 07 Aug 2017 08:00 )             28.0       08-07    141  |  112<H>  |  20  ----------------------------<  132<H>  3.8   |  15<L>  |  1.09    Ca    8.4      07 Aug 2017 09:06    TPro  6.2  /  Alb  3.0<L>  /  TBili  17.8<H>  /  DBili  x   /  AST  120<H>  /  ALT  78<H>  /  AlkPhos  199<H>  08-07    Culture - Urine (08.05.17 @ 20:55)    Specimen Source: .Urine Clean Catch (Midstream)    Culture Results:   Culture grew 3 or more types of organisms which indicate  collection contamination; consider recollection only if clinically  indicated.

## 2017-08-07 NOTE — PROGRESS NOTE ADULT - ASSESSMENT
I had discussed the patient's condition and plan of management in full detail with patient's family, specially daughter.  She understands that diagnostic procedure by GI/ IR will be needed and that will help with diagnosis and palliation of jaundice as well. ERCP is planned for AM  Once we know the etiology of obstructive jaundice at that time further discussion can be done regarding management keeping mind patient's age.   At present pancreatic neoplasm is favored ( no LN's from lymphoma mentioned in CT) though knowing her PMH of Follicular lymphoma etc other etiologies cannot be excluded.   Patient also has h/o breast cancer with bone mets but disease clinically has been stable from her history and present imaging available. She is on hormonal RX  Anemia can be from multiple etiologies including bleeding, deficiency, NHL and malignancy.  PRBC being given. Will follow pending results

## 2017-08-07 NOTE — PROGRESS NOTE ADULT - ASSESSMENT
87 yo with obstructive jaundice ?malignancy  For ERCP  No s/s clinically of UTI  Would rec dc levaquin and observe off abx  jaundice,ampullary stricture ?Ca  Will defer to primary team  Case d/w med np  ID will follow as needed,please call [phone] if any questions or issues.

## 2017-08-07 NOTE — PROGRESS NOTE ADULT - SUBJECTIVE AND OBJECTIVE BOX
- Patient seen and examined.  - In summary, patient is a 88y year old woman who presented with jaundice (05 Aug 2017 18:30)  - Today, patient is without complaints.         *****MEDICATIONS:    MEDICATIONS  (STANDING):  metoprolol 25 milliGRAM(s) Oral two times a day  valsartan 40 milliGRAM(s) Oral daily  gabapentin 600 milliGRAM(s) Oral three times a day  pramipexole 0.25 milliGRAM(s) Oral at bedtime  levoFLOXacin  Tablet 250 milliGRAM(s) Oral every 24 hours  enoxaparin Injectable 60 milliGRAM(s) SubCutaneous two times a day  furosemide   Injectable 40 milliGRAM(s) IV Push daily  toremifene 60 milliGRAM(s) Oral daily    MEDICATIONS  (PRN):  HYDROmorphone  Injectable 0.5 milliGRAM(s) IV Push three times a day PRN Moderate Pain (4 - 6)           ***** REVIEW OF SYSTEM:  GEN: no fever, no chills, no pain  RESP: no SOB, no cough, no sputum  CVS: no chest pain, no palpitations, no edema  GI: no abdominal pain, no nausea, no vomiting, no constipation, no diarrhea  : no dysurea, no frequency  NEURO: no headache, no diziness  PSYCH: no depression, not anxious  Derm : no itching, no rash         ***** VITAL SIGNS:  T(F): 99 (17 @ 10:44), Max: 99 (17 @ 10:44)  HR: 85 (17 @ 10:44) (85 - 104)  BP: 111/77 (17 @ 10:44) (109/72 - 133/85)  RR: 85 (17 @ 10:44) (18 - 85)  SpO2: 99% (17 @ 10:44) (98% - 100%)  Wt(kg): --  ,   I&O's Summary    06 Aug 2017 07:01  -  07 Aug 2017 07:00  --------------------------------------------------------  IN: 1020 mL / OUT: 0 mL / NET: 1020 mL             *****PHYSICAL EXAM:  GEN: A&O X 3 , NAD , comfortable  HEENT: NCAT, EOMI, MMM, no icterus  NECK: Supple, No JVD  CVS: S1S2 , regular , No M/R/G appreciated  PULM: CTA B/L,  no W/R/R appreciated  ABD.: soft. non tender, non distended,  bowel sounds present  Extrem: intact pulses , no edema noted  Derm: No rash or ecchymosis noted  PSYCH: normal mood, no depression, not anxious         *****LAB AND IMAGIN.9    6.85  )-----------( 144      ( 07 Aug 2017 08:00 )             28.0               08-    141  |  112<H>  |  20  ----------------------------<  132<H>  3.8   |  15<L>  |  1.09    Ca    8.4      07 Aug 2017 09:06    TPro  6.2  /  Alb  3.0<L>  /  TBili  17.8<H>  /  DBili  x   /  AST  120<H>  /  ALT  78<H>  /  AlkPhos  199<H>  08-07                       Urinalysis Basic - ( 05 Aug 2017 19:39 )    Color: Yellow / Appearance: x / SG: >1.030 / pH: x  Gluc: x / Ketone: Negative  / Bili: Large / Urobili: 1   Blood: x / Protein: 30 mg/dL / Nitrite: Negative   Leuk Esterase: Large / RBC: 5-10 /HPF / WBC 26-50 /HPF   Sq Epi: x / Non Sq Epi: Few /HPF / Bacteria: Few /HPF      [All pertinent recent Imaging/Reports reviewed]         *****A S S E S S M E N T   A N D   P L A N :    88F CHF, AF, jaundice  onc f/u  AC as able  keep O=I  await WRI  TTE pending  cont current tx    __________________________  JIMENEZ Red D.O.

## 2017-08-07 NOTE — PROGRESS NOTE ADULT - SUBJECTIVE AND OBJECTIVE BOX
HPI:  Patient with PMH of breast cancer with bone mets on hormonal therapy, h/o follicular lymphoma s/p Rituximab with obstructive jaundice.  Details as per consult note    PAST MEDICAL & SURGICAL HISTORY:    Medications:  metoprolol 25 milliGRAM(s) Oral two times a day  valsartan 40 milliGRAM(s) Oral daily  gabapentin 600 milliGRAM(s) Oral three times a day  pramipexole 0.25 milliGRAM(s) Oral at bedtime  HYDROmorphone  Injectable 0.5 milliGRAM(s) IV Push three times a day PRN Moderate Pain (4 - 6)  levoFLOXacin  Tablet 250 milliGRAM(s) Oral every 24 hours  enoxaparin Injectable 60 milliGRAM(s) SubCutaneous two times a day  furosemide   Injectable 40 milliGRAM(s) IV Push daily  toremifene 60 milliGRAM(s) Oral daily    Labs:  CBC Full  -  ( 07 Aug 2017 08:00 )  WBC Count : 6.85 K/uL  Hemoglobin : 8.9 g/dL  Hematocrit : 28.0 %  Platelet Count - Automated : 144 K/uL  Mean Cell Volume : 89.7 fl  Mean Cell Hemoglobin : 28.5 pg  Mean Cell Hemoglobin Concentration : 31.8 gm/dL  Auto Neutrophil # : x  Auto Lymphocyte # : x  Auto Monocyte # : x  Auto Eosinophil # : x  Auto Basophil # : x  Auto Neutrophil % : x  Auto Lymphocyte % : x  Auto Monocyte % : x  Auto Eosinophil % : x  Auto Basophil % : x    08-07    141  |  112<H>  |  20  ----------------------------<  132<H>  3.8   |  15<L>  |  1.09    Ca    8.4      07 Aug 2017 09:06    TPro  6.2  /  Alb  3.0<L>  /  TBili  17.8<H>  /  DBili  x   /  AST  120<H>  /  ALT  78<H>  /  AlkPhos  199<H>  08-07      Radiology:   HPI:  88F hx htn afib on pradaxa,  chf, ,  seen in er breast ca and lymphoma on oral chemo presents to the ED as a transfer from [facility] for jaundice and diarrhea. Pt has had jaundice/diarrhea x 3 weeks. waited until today because  was sick and passed away yesterday. 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 transferred to see GI. Pt denies any pain at this time.,  seen  in  er (05 Aug 2017 18:30)    PAST MEDICAL & SURGICAL HISTORY:    Medications:  metoprolol 25 milliGRAM(s) Oral two times a day  valsartan 40 milliGRAM(s) Oral daily  gabapentin 600 milliGRAM(s) Oral three times a day  pramipexole 0.25 milliGRAM(s) Oral at bedtime  HYDROmorphone  Injectable 0.5 milliGRAM(s) IV Push three times a day PRN Moderate Pain (4 - 6)  levoFLOXacin  Tablet 250 milliGRAM(s) Oral every 24 hours  enoxaparin Injectable 60 milliGRAM(s) SubCutaneous two times a day  furosemide   Injectable 40 milliGRAM(s) IV Push daily  toremifene 60 milliGRAM(s) Oral daily    Labs:  CBC Full  -  ( 07 Aug 2017 08:00 )  WBC Count : 6.85 K/uL  Hemoglobin : 8.9 g/dL  Hematocrit : 28.0 %  Platelet Count - Automated : 144 K/uL  Mean Cell Volume : 89.7 fl  Mean Cell Hemoglobin : 28.5 pg  Mean Cell Hemoglobin Concentration : 31.8 gm/dL  Auto Neutrophil # : x  Auto Lymphocyte # : x  Auto Monocyte # : x  Auto Eosinophil # : x  Auto Basophil # : x  Auto Neutrophil % : x  Auto Lymphocyte % : x  Auto Monocyte % : x  Auto Eosinophil % : x  Auto Basophil % : x    08-07    141  |  112<H>  |  20  ----------------------------<  132<H>  3.8   |  15<L>  |  1.09    Ca    8.4      07 Aug 2017 09:06    TPro  6.2  /  Alb  3.0<L>  /  TBili  17.8<H>  /  DBili  x   /  AST  120<H>  /  ALT  78<H>  /  AlkPhos  199<H>  08-07    Iron with Total Binding Capacity in AM (08.07.17 @ 09:06)    Iron - Total Binding Capacity.: 306 ug/dL    % Saturation, Iron: 56 %    Iron Total, Serum: 171 ug/dL    Unsaturated Iron Binding Capacity: 135 ug/dL    Lactate Dehydrogenase, Serum in AM (08.07.17 @ 09:06)    Lactate Dehydrogenase, Serum: 289 U/L    Reticulocyte Count in AM (08.07.17 @ 08:00)    RBC Count: 3.12 M/uL    Reticulocyte Percent: 3.6 %    Absolute Reticulocytes: 111.4 K/uL    Direct Luis Poly: Negative (08.06.17 @ 13:26)    Direct Luis Poly: Negative (08.06.17 @ 13:26)                ROS:  Patient comfortable without distress  No SOB or chest pain, but getting oxygen by NC  No palpitation  No abdominal pain, diarrhaea or constipation  No weakness of extremities  No skin changes or swelling of legs    Vital Signs Last 24 Hrs  T(C): 37.2 (07 Aug 2017 10:44), Max: 37.2 (07 Aug 2017 10:44)  T(F): 99 (07 Aug 2017 10:44), Max: 99 (07 Aug 2017 10:44)  HR: 85 (07 Aug 2017 10:44) (85 - 104)  BP: 111/77 (07 Aug 2017 10:44) (109/72 - 133/85)  BP(mean): --  RR: 85 (07 Aug 2017 10:44) (18 - 85)  SpO2: 99% (07 Aug 2017 10:44) (98% - 100%)    Physical exam:  Patient alert and oriented. Icteric  No distress, right breast construction  CVS: S1, S2 regular or murmur  Chest: bilateral breath sound without rales  Abdomen: soft, not tender, no organomegaly or masses  No focal neuro deficit  No edema      Assessment and Plan:

## 2017-08-08 LAB
ALBUMIN SERPL ELPH-MCNC: 2.8 G/DL — LOW (ref 3.3–5)
ALP SERPL-CCNC: 216 U/L — HIGH (ref 40–120)
ALT FLD-CCNC: 80 U/L — HIGH (ref 10–45)
ANION GAP SERPL CALC-SCNC: 14 MMOL/L — SIGNIFICANT CHANGE UP (ref 5–17)
ANISOCYTOSIS BLD QL: SLIGHT — SIGNIFICANT CHANGE UP
AST SERPL-CCNC: 128 U/L — HIGH (ref 10–40)
BASOPHILS # BLD AUTO: 0.03 K/UL — SIGNIFICANT CHANGE UP (ref 0–0.2)
BASOPHILS NFR BLD AUTO: 0.4 % — SIGNIFICANT CHANGE UP (ref 0–2)
BCA 255 TISS QL IMSTN: 33.5 U/ML — HIGH
BILIRUB SERPL-MCNC: 16.3 MG/DL — HIGH (ref 0.2–1.2)
BUN SERPL-MCNC: 19 MG/DL — SIGNIFICANT CHANGE UP (ref 7–23)
CALCIUM SERPL-MCNC: 7.7 MG/DL — LOW (ref 8.4–10.5)
CEA SERPL-MCNC: 7.5 NG/ML — HIGH (ref 0–3.8)
CHLORIDE SERPL-SCNC: 111 MMOL/L — HIGH (ref 96–108)
CO2 SERPL-SCNC: 17 MMOL/L — LOW (ref 22–31)
CREAT SERPL-MCNC: 1.05 MG/DL — SIGNIFICANT CHANGE UP (ref 0.5–1.3)
EOSINOPHIL # BLD AUTO: 0.18 K/UL — SIGNIFICANT CHANGE UP (ref 0–0.5)
EOSINOPHIL NFR BLD AUTO: 2.7 % — SIGNIFICANT CHANGE UP (ref 0–6)
GLUCOSE SERPL-MCNC: 140 MG/DL — HIGH (ref 70–99)
HCT VFR BLD CALC: 29 % — LOW (ref 34.5–45)
HGB BLD-MCNC: 9.1 G/DL — LOW (ref 11.5–15.5)
HYPOCHROMIA BLD QL: SLIGHT — SIGNIFICANT CHANGE UP
IMM GRANULOCYTES NFR BLD AUTO: 0.1 % — SIGNIFICANT CHANGE UP (ref 0–1.5)
LYMPHOCYTES # BLD AUTO: 0.85 K/UL — LOW (ref 1–3.3)
LYMPHOCYTES # BLD AUTO: 12.6 % — LOW (ref 13–44)
MACROCYTES BLD QL: SLIGHT — SIGNIFICANT CHANGE UP
MANUAL SMEAR VERIFICATION: SIGNIFICANT CHANGE UP
MCHC RBC-ENTMCNC: 28.3 PG — SIGNIFICANT CHANGE UP (ref 27–34)
MCHC RBC-ENTMCNC: 31.4 GM/DL — LOW (ref 32–36)
MCV RBC AUTO: 90.1 FL — SIGNIFICANT CHANGE UP (ref 80–100)
MICROCYTES BLD QL: SLIGHT — SIGNIFICANT CHANGE UP
MONOCYTES # BLD AUTO: 0.45 K/UL — SIGNIFICANT CHANGE UP (ref 0–0.9)
MONOCYTES NFR BLD AUTO: 6.7 % — SIGNIFICANT CHANGE UP (ref 2–14)
NEUTROPHILS # BLD AUTO: 5.23 K/UL — SIGNIFICANT CHANGE UP (ref 1.8–7.4)
NEUTROPHILS NFR BLD AUTO: 77.5 % — HIGH (ref 43–77)
OVALOCYTES BLD QL SMEAR: SLIGHT — SIGNIFICANT CHANGE UP
PLAT MORPH BLD: NORMAL — SIGNIFICANT CHANGE UP
PLATELET # BLD AUTO: 150 K/UL — SIGNIFICANT CHANGE UP (ref 150–400)
POIKILOCYTOSIS BLD QL AUTO: SLIGHT — SIGNIFICANT CHANGE UP
POTASSIUM SERPL-MCNC: 3.8 MMOL/L — SIGNIFICANT CHANGE UP (ref 3.5–5.3)
POTASSIUM SERPL-SCNC: 3.8 MMOL/L — SIGNIFICANT CHANGE UP (ref 3.5–5.3)
PROT SERPL-MCNC: 6.3 G/DL — SIGNIFICANT CHANGE UP (ref 6–8.3)
RBC # BLD: 3.22 M/UL — LOW (ref 3.8–5.2)
RBC # FLD: 23.2 % — HIGH (ref 10.3–14.5)
RBC BLD AUTO: ABNORMAL
SODIUM SERPL-SCNC: 142 MMOL/L — SIGNIFICANT CHANGE UP (ref 135–145)
WBC # BLD: 6.75 K/UL — SIGNIFICANT CHANGE UP (ref 3.8–10.5)
WBC # FLD AUTO: 6.75 K/UL — SIGNIFICANT CHANGE UP (ref 3.8–10.5)

## 2017-08-08 PROCEDURE — 74328 X-RAY BILE DUCT ENDOSCOPY: CPT | Mod: 26,GC

## 2017-08-08 PROCEDURE — 43259 EGD US EXAM DUODENUM/JEJUNUM: CPT | Mod: 59,GC

## 2017-08-08 PROCEDURE — 93306 TTE W/DOPPLER COMPLETE: CPT | Mod: 26

## 2017-08-08 PROCEDURE — 43274 ERCP DUCT STENT PLACEMENT: CPT | Mod: GC

## 2017-08-08 RX ADMIN — GABAPENTIN 600 MILLIGRAM(S): 400 CAPSULE ORAL at 11:21

## 2017-08-08 RX ADMIN — TOREMIFENE CITRATE 60 MILLIGRAM(S): 60 TABLET ORAL at 11:23

## 2017-08-08 RX ADMIN — GABAPENTIN 600 MILLIGRAM(S): 400 CAPSULE ORAL at 21:48

## 2017-08-08 RX ADMIN — PRAMIPEXOLE DIHYDROCHLORIDE 0.25 MILLIGRAM(S): 0.12 TABLET ORAL at 21:48

## 2017-08-08 RX ADMIN — VALSARTAN 40 MILLIGRAM(S): 80 TABLET ORAL at 11:21

## 2017-08-08 RX ADMIN — Medication 40 MILLIGRAM(S): at 06:35

## 2017-08-08 RX ADMIN — Medication 25 MILLIGRAM(S): at 11:21

## 2017-08-08 NOTE — PROVIDER CONTACT NOTE (MEDICATION) - SITUATION
pt NPO after MN for possible EGD pending cardiac clearance. Pt due for valsartan, gabapentin and Lopressor orally, does provider want to give or hold? Pt also due for lovenox

## 2017-08-08 NOTE — PROGRESS NOTE ADULT - ASSESSMENT
· Assessment		  I had discussed the patient's condition and plan of management in full detail with patient's family, specially daughter.  She os scheculed for  ERCP today  Once we know the etiology of obstructive jaundice at that time further discussion can be done regarding management keeping mind patient's age.   At present pancreatic neoplasm is favored ( no LN's from lymphoma mentioned in CT) though knowing her PMH of Follicular lymphoma etc other etiologies cannot be excluded.   Patient also has h/o breast cancer with bone mets but disease clinically has been stable from her history and present imaging available. She is on hormonal RX  Anemia can be from etiologies like bleeding, NHL and malignancy. Had PRBC's.

## 2017-08-08 NOTE — PROGRESS NOTE ADULT - ASSESSMENT
H/O CA BREAST     NOW WITH  JAUNDICE    AWAITING  MRCP   HOUSE  GI  TO  F/P H/O CA BREAST     NOW WITH  JAUNDICE    AWAITING  MRCP report   HOUSE  GI  TO  F/P, AWAITING ERCP    PT  WITH NO CARDIAC HISTORY,  SPOKE  WITH CARD  DR SANTAMARIA,  WHO  WILL CLEAR  PT    PT  NEEDS INTERVENTION FOR  JAUNDICE,  AND  AT   AGE  88, WILL CLEAR   PT  FOR  PALLIATIVE  PURPOSES

## 2017-08-08 NOTE — PROGRESS NOTE ADULT - SUBJECTIVE AND OBJECTIVE BOX
Pre-Endoscopy Evaluation      Referring Physician:                                    Procedure:    Indication for Procedure:    Pertinent History:    Sedation by Anesthesia [ ]    PAST MEDICAL & SURGICAL HISTORY:      PMH of Gastroparesis [ ]  Gastric Surgery [ ]  Gastric Outlet Obstruction [ ]    Allergies    penicillins (Rash)    Intolerances    Latex allergy: [ ] yes [x] no    Medications:MEDICATIONS  (STANDING):  metoprolol 25 milliGRAM(s) Oral two times a day  valsartan 40 milliGRAM(s) Oral daily  gabapentin 600 milliGRAM(s) Oral three times a day  pramipexole 0.25 milliGRAM(s) Oral at bedtime  enoxaparin Injectable 60 milliGRAM(s) SubCutaneous two times a day  furosemide   Injectable 40 milliGRAM(s) IV Push daily  toremifene 60 milliGRAM(s) Oral daily    MEDICATIONS  (PRN):  HYDROmorphone  Injectable 0.5 milliGRAM(s) IV Push three times a day PRN Moderate Pain (4 - 6)      Smoking: [ ] yes  [x] no    AICD/PPM: [ ] yes   [x] no    Pertinent lab data:                        9.1    6.75  )-----------( 150      ( 08 Aug 2017 09:34 )             29.0     08-08    142  |  111<H>  |  19  ----------------------------<  140<H>  3.8   |  17<L>  |  1.05    Ca    7.7<L>      08 Aug 2017 09:34    TPro  6.3  /  Alb  2.8<L>  /  TBili  16.3<H>  /  DBili  x   /  AST  128<H>  /  ALT  80<H>  /  AlkPhos  216<H>  08-08        Physical Examination:  Daily Height in cm: 160.02 (08 Aug 2017 07:02)    Daily   Vital Signs Last 24 Hrs  T(C): 36.6 (08 Aug 2017 10:32), Max: 36.6 (07 Aug 2017 20:26)  T(F): 97.9 (08 Aug 2017 10:32), Max: 97.9 (07 Aug 2017 20:26)  HR: 110 (08 Aug 2017 10:32) (97 - 110)  BP: 115/77 (08 Aug 2017 10:32) (113/65 - 115/77)  BP(mean): --  RR: 18 (08 Aug 2017 10:32) (18 - 18)  SpO2: 100% (08 Aug 2017 10:32) (97% - 100%)      Constitutional: NAD    HEENT: PERRLA, EOMI,       Neck:  No JVD    Respiratory: CTAB/L    Cardiovascular: S1 and S2    Gastrointestinal: BS+, soft, NT/ND    Extremities: No peripheral edema    Neurological: A/O x 3, no focal deficits    Psychiatric: Normal mood, normal affect    : No Arguello    Skin: No rashes    Comments:    ASA Class: I [ ]  II [ ]  III [ ]  IV [ ]    The patient is a suitable candidate for the planned procedure unless box checked [ ]  No, explain: Pre-Endoscopy Evaluation      Referring Physician:     Grecia Correa MD                               Procedure:EUS/ERCP    Indication for Procedure: Obstructive Jaundice    Pertinent History: 88 year old female with hx of HTN, AFib on a/c, CHF, Breast Ca and Lymphoma who presented to [facility] with a complaint of jaundice      PAST MEDICAL & SURGICAL HISTORY:      PMH of Gastroparesis [ ]  Gastric Surgery [ ]  Gastric Outlet Obstruction [ ] no    Allergies:    penicillins (Rash)    Intolerances:    Latex allergy: [ ] yes [x] no    Medications:MEDICATIONS  (STANDING):  metoprolol 25 milliGRAM(s) Oral two times a day  valsartan 40 milliGRAM(s) Oral daily  gabapentin 600 milliGRAM(s) Oral three times a day  pramipexole 0.25 milliGRAM(s) Oral at bedtime  enoxaparin Injectable 60 milliGRAM(s) SubCutaneous two times a day  furosemide   Injectable 40 milliGRAM(s) IV Push daily  toremifene 60 milliGRAM(s) Oral daily    MEDICATIONS  (PRN):  HYDROmorphone  Injectable 0.5 milliGRAM(s) IV Push three times a day PRN Moderate Pain (4 - 6)      Smoking: [ ] yes  [x] no    AICD/PPM: [ ] yes   [x] no    Pertinent lab data:                        9.1    6.75  )-----------( 150      ( 08 Aug 2017 09:34 )             29.0     08-08    142  |  111<H>  |  19  ----------------------------<  140<H>  3.8   |  17<L>  |  1.05    Ca    7.7<L>      08 Aug 2017 09:34    TPro  6.3  /  Alb  2.8<L>  /  TBili  16.3<H>  /  DBili  x   /  AST  128<H>  /  ALT  80<H>  /  AlkPhos  216<H>  08-08    < from: Transthoracic Echocardiogram (08.08.17 @ 14:23) >    Patient name: CRISTINA ZUNIGA  YOB: 1929   Age: 88 (F)   MR#: [id number]  Study Date: 8/8/2017  Location: [address] [place], [place] [id number]: Lizeth Joel RDCS  Study quality: Technically difficult  Referring Physician: Grecia Correa MD  Blood Pressure: 109/59 mmHg  Height: 160 cm  Weight: 62 kg  BSA: 1.6 m2  ------------------------------------------------------------------------  PROCEDURE: Transthoracic echocardiogram with 2-D, M-Mode  and complete spectral and color flow Doppler.  INDICATION: Unspecified combined systolic (congestive) and  diastolic (congestive) heart failure (I50.40)  ------------------------------------------------------------------------  Dimensions:    Normal Values:  LA:     4.2    2.0 - 4.0 cm  Ao:     2.9    2.0 - 3.8 cm  SEPTUM: 0.8    0.6 - 1.2 cm  PWT:    0.7    0.6 - 1.1 cm  LVIDd:  4.5    3.0 - 5.6 cm  LVIDs:  3.4    1.8 - 4.0 cm  Derived variables:  LVMI: 64 g/m2  RWT: 0.31  Doppler Peak Velocity (m/sec): AoV=1.2  ------------------------------------------------------------------------  Observations:  Mitral Valve: Mitral annular calcification. Tethered mitral  valve leaflets. Moderate-severe mitral regurgitation. Mean  transmitral valve gradient equals 2-3 mm Hg, consistent  with mild mitral stenosis.  Aortic Valve/Aorta: Aortic valve not well visualized;  appears calcified. Peak transaortic valve gradient equals 6  mm Hg. Minimal aortic regurgitation. Peak left ventricular  outflow tract gradient equals 3 mm Hg.  Aortic Root: 2.9 cm.  LVOTdiameter: 1.9 cm.  Left Atrium: Moderately dilated left atrium.  LA volume  index = 46 cc/m2.  Left Ventricle: Endocardium not well visualized; grossly  mild global left ventricular systolic dysfunction. There is  a false tendon in the LV cavity (normal variant). Septal  flattening consistent with right ventricular overload.  Normal left ventricular internal dimensions and wall  thicknesses.  Right Heart: Severe right atrial enlargement. Right  ventricular enlargement with decreased right ventricular  systolic function. A device wire is noted in the right  heart. Tricuspid annular dilataton with normal leaflet  opening. Severe tricuspid regurgitation. Pulmonic valve not  well visualized, probably normal. No pulmonic  regurgitation.  Pericardium/Pleura: Normal pericardium with no pericardial  effusion.  Hemodynamic: Estimated right atrial pressure is 8 mm Hg.  Estimated right ventricular systolic pressure equals 57 mm  Hg, assuming right atrial pressure equals 8 mm Hg,  consistent with moderate pulmonary hypertension.  ------------------------------------------------------------------------  Conclusions:  1. Mitral annular calcification. Tethered mitral valve  leaflets. Moderate-severe mitral regurgitation. Mean  transmitral valve gradient equals 2-3 mm Hg, consistent  with mild mitral stenosis.  2. Aortic valve not well visualized; appears calcified.  Minimal aortic regurgitation.  3. Endocardium not well visualized; grossly mild global  left ventricular systolic dysfunction. There is a false  tendon in the LV cavity (normal variant). Septal flattening  consistent with right ventricular overload.  4. Right ventricular enlargement with decreased right  ventricular systolic function. A device wire is noted in  the right heart.  5. Tricuspid annular dilataton with normal leaflet opening.  Severe tricuspid regurgitation.  6. Estimated pulmonary artery systolic pressure equals 57  mm Hg, assuming right atrial pressure equals 8 mm Hg,  consistent with moderate pulmonary pressures.      Physical Examination:  Daily Height in cm: 160.02 (08 Aug 2017 07:02)    Daily   Vital Signs Last 24 Hrs  T(C): 36.6 (08 Aug 2017 10:32), Max: 36.6 (07 Aug 2017 20:26)  T(F): 97.9 (08 Aug 2017 10:32), Max: 97.9 (07 Aug 2017 20:26)  HR: 110 (08 Aug 2017 10:32) (97 - 110)  BP: 115/77 (08 Aug 2017 10:32) (113/65 - 115/77)  BP(mean): --  RR: 18 (08 Aug 2017 10:32) (18 - 18)  SpO2: 100% (08 Aug 2017 10:32) (97% - 100%)      Constitutional: NAD    HEENT: PERRLA, EOMI,       Neck:  No JVD    Respiratory: CTAB/L    Cardiovascular: S1 and S2    Gastrointestinal: BS+, soft, NT/ND    Extremities: No peripheral edema    Neurological: A/O x 3, no focal deficits    Comments:    ASA Class: I [ ]  II [ ]  III [ ]  IV [x]    The patient is a suitable candidate for the planned procedure unless box checked [ ]  No, explain:

## 2017-08-08 NOTE — CHART NOTE - NSCHARTNOTEFT_GEN_A_CORE
RRT Note    RRT called for hypotension. Patient had SBP in about 90s for an hour in endoscopy suite, but had been transferred to PACU, with SBP of 90s. Patient had ERCP with stent placed for obstructive jaundice and patient had general anesthesia. Patient had been given 500cc bolus and patient had a push of phenylephrine by anesthesiology team. Patient currently asymptomatic and denies any symptoms, such as dizziness. At bedside, patient AAOx2-3, no other significant findings on exam, no fever. Patient with also hx of CHF, with mild LV dysfunction and RV dysfunction. Labs from AM reviewed. Patient is currently being managed in PACU and anesthesiology team. No other interventions, as patient asymptomatic with SBP. Discussed with team, if patient will SBP of 90s, can try gentle boluses of IVF as patient currently with clear lungs.    SAM[place]  [id number]

## 2017-08-08 NOTE — CHART NOTE - NSCHARTNOTEFT_GEN_A_CORE
Brief Therapeutic Endoscopy Service note:    ERCP:  The  film was normal.  The esophagus was successfully intubated under direct vision.  The scope was advanced to a normal major papilla in the descending duodenum without detailed examination of the pharynx, larynx and associated structures, and upper GI tract.  The upper GI tract was grossly normal.  The bile duct was deeply cannulated with an RX 44 sphinctertome pre loaded with a hydra jagwire.  Contrast was injected.  I personally interpreted the bile duct images.  There was brisk flow of contrast through the ducts.  Image quality was excellent.  Contrast extended to the bifurcation. Opacification of the entire opacified area was successful.  The common bile duct was markedly dilated up to a maximum diameter of 17 mm.  Biliary sphincterotomy was performed using the sphincterotome.  There was no post-sphincterotomy bleeding.  Using a sphinctertome, we attempted to cannulate the pancreatic duct but were unsuccessful.  A 10 mm x 60 mm covered metal stent was deployed into the common bile duct using fluoroscopic guidance.  A distal CBD stricture was seen on fluoroscopy.    Impression/Recs:  Distal bile duct stricture s/p ERCP with sphincterotomy and placement of covered metal stent.    - [facility].   - Resume Pradaxa (dabigatran) at prior dose in 2 days.   - Observe patient's clinical course. Can resume liquid diet, and advance diet as tolerated tomorrow.  - Monitor for signs/symptoms of pancreatitis.

## 2017-08-08 NOTE — PROGRESS NOTE ADULT - SUBJECTIVE AND OBJECTIVE BOX
HPI:  Patient with h/o MBC and Follicular NHL s/p Rituxan with obstructive jaundice for ERCP today.  PAST MEDICAL & SURGICAL HISTORY:    Medications:  metoprolol 25 milliGRAM(s) Oral two times a day  valsartan 40 milliGRAM(s) Oral daily  gabapentin 600 milliGRAM(s) Oral three times a day  pramipexole 0.25 milliGRAM(s) Oral at bedtime  HYDROmorphone  Injectable 0.5 milliGRAM(s) IV Push three times a day PRN Moderate Pain (4 - 6)  enoxaparin Injectable 60 milliGRAM(s) SubCutaneous two times a day  furosemide   Injectable 40 milliGRAM(s) IV Push daily  toremifene 60 milliGRAM(s) Oral daily    Labs:  CBC Full  -  ( 08 Aug 2017 09:34 )  WBC Count : 6.75 K/uL  Hemoglobin : 9.1 g/dL  Hematocrit : 29.0 %  Platelet Count - Automated : 150 K/uL  Mean Cell Volume : 90.1 fl  Mean Cell Hemoglobin : 28.3 pg  Mean Cell Hemoglobin Concentration : 31.4 gm/dL  Auto Neutrophil # : x  Auto Lymphocyte # : x  Auto Monocyte # : x  Auto Eosinophil # : x  Auto Basophil # : x  Auto Neutrophil % : x  Auto Lymphocyte % : x  Auto Monocyte % : x  Auto Eosinophil % : x  Auto Basophil % : x    08-08    142  |  111<H>  |  19  ----------------------------<  140<H>  3.8   |  17<L>  |  1.05    Ca    7.7<L>      08 Aug 2017 09:34    TPro  6.3  /  Alb  2.8<L>  /  TBili  16.3<H>  /  DBili  x   /  AST  128<H>  /  ALT  80<H>  /  AlkPhos  216<H>  08-08    HPI:  88F hx htn afib on pradaxa,  chf, ,  seen in er breast ca and lymphoma on oral chemo presents to the ED as a transfer from [place] for jaundice and diarrhea. Pt has had jaundice/diarrhea x 3 weeks. waited until today because  was sick and passed away yesterday. 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 transferred to see GI. Pt denies any pain at this time.,  seen  in  er (05 Aug 2017 18:30)    PAST MEDICAL & SURGICAL HISTORY:    Medications:  metoprolol 25 milliGRAM(s) Oral two times a day  valsartan 40 milliGRAM(s) Oral daily  gabapentin 600 milliGRAM(s) Oral three times a day  pramipexole 0.25 milliGRAM(s) Oral at bedtime  HYDROmorphone  Injectable 0.5 milliGRAM(s) IV Push three times a day PRN Moderate Pain (4 - 6)  enoxaparin Injectable 60 milliGRAM(s) SubCutaneous two times a day  furosemide   Injectable 40 milliGRAM(s) IV Push daily  toremifene 60 milliGRAM(s) Oral daily    Labs:  CBC Full  -  ( 08 Aug 2017 09:34 )  WBC Count : 6.75 K/uL  Hemoglobin : 9.1 g/dL  Hematocrit : 29.0 %  Platelet Count - Automated : 150 K/uL  Mean Cell Volume : 90.1 fl  Mean Cell Hemoglobin : 28.3 pg  Mean Cell Hemoglobin Concentration : 31.4 gm/dL  Auto Neutrophil # : x  Auto Lymphocyte # : x  Auto Monocyte # : x  Auto Eosinophil # : x  Auto Basophil # : x  Auto Neutrophil % : x  Auto Lymphocyte % : x  Auto Monocyte % : x  Auto Eosinophil % : x  Auto Basophil % : x    08-08    142  |  111<H>  |  19  ----------------------------<  140<H>  3.8   |  17<L>  |  1.05    Ca    7.7<L>      08 Aug 2017 09:34    TPro  6.3  /  Alb  2.8<L>  /  TBili  16.3<H>  /  DBili  x   /  AST  128<H>  /  ALT  80<H>  /  AlkPhos  216<H>  08-08      Patient has small IgM MGUS.  CEA slightly high, Ca 19-9 pending             ROS:  Patient comfortable without distress  No SOB or chest pain  No palpitation  No abdominal pain, diarrhaea or constipation  No weakness of extremities  No skin changes or swelling of legs    Vital Signs Last 24 Hrs  T(C): 36.6 (08 Aug 2017 10:32), Max: 36.6 (07 Aug 2017 14:07)  T(F): 97.9 (08 Aug 2017 10:32), Max: 97.9 (07 Aug 2017 20:26)  HR: 110 (08 Aug 2017 10:32) (92 - 110)  BP: 115/77 (08 Aug 2017 10:32) (113/65 - 135/55)  BP(mean): --  RR: 18 (08 Aug 2017 10:32) (18 - 18)  SpO2: 100% (08 Aug 2017 10:32) (97% - 100%)    Physical exam:  Patient alert and oriented  No distress  CVS: S1, S2 regular or murmur  Chest: bilateral breath sound without rales  Abdomen: soft, not tender, no organomegaly or masses  No focal neuro deficit  No edema      Assessment and Plan:Height (cm): 160.02 (08-08 @ 07:02)  Weight (kg): 61.5 (08-08 @ 07:02)  BMI (kg/m2): 24 (08-08 @ 07:02)  BSA (m2): 1.64 (08-08 @ 07:02)            ROS:  Patient icteric, waiting for her procedure.  No SOB or chest pain but has oxygen by NC  No palpitation  No abdominal pain, diarrhaea or constipation      Vital Signs Last 24 Hrs  T(C): 36.6 (08 Aug 2017 10:32), Max: 36.6 (07 Aug 2017 14:07)  T(F): 97.9 (08 Aug 2017 10:32), Max: 97.9 (07 Aug 2017 20:26)  HR: 110 (08 Aug 2017 10:32) (92 - 110)  BP: 115/77 (08 Aug 2017 10:32) (113/65 - 135/55)  BP(mean): --  RR: 18 (08 Aug 2017 10:32) (18 - 18)  SpO2: 100% (08 Aug 2017 10:32) (97% - 100%)    Physical exam:  Patient alert and oriented, icteric  No distress  CVS: S1, S2 regular or murmur  Chest: bilateral breath sound without rales  Abdomen: soft, not tender, no organomegaly or masses  No focal neuro deficit  No edema      Assessment and Plan:Height (cm): 160.02 (08-08 @ 07:02)  Weight (kg): 61.5 (08-08 @ 07:02)  BMI (kg/m2): 24 (08-08 @ 07:02)  BSA (m2): 1.64 (08-08 @ 07:02)

## 2017-08-08 NOTE — PROGRESS NOTE ADULT - SUBJECTIVE AND OBJECTIVE BOX
SUBJECTIVE / OVERNIGHT EVENTS: No nausea, vomiting or diarrhea, no fever or chills, no dizziness or chest pain, no dysuria or hematuria .      CAPILLARY BLOOD GLUCOSE        I&O's Summary    07 Aug 2017 07:01  -  08 Aug 2017 07:00  --------------------------------------------------------  IN: 360 mL / OUT: 0 mL / NET: 360 mL        PHYSICAL EXAM:  jaundiced  HEAD:  Atraumatic, Normocephalic  EYES: EOMI, PERRLA, conjunctiva and sclera clear  NECK: Supple, No JVD  CHEST/LUNG: Clear to auscultation bilaterally; No wheeze  HEART: Regular rate and rhythm; No murmurs, rubs, or gallops  ABDOMEN: Soft, Nontender, Nondistended; Bowel sounds present  EXTREMITIES:  2+ Peripheral Pulses, No clubbing, cyanosis, or edema  PSYCH: AAOx3  NEUROLOGY: non-focal  SKIN: No rashes or lesions    MEDICATIONS  (STANDING):  metoprolol 25 milliGRAM(s) Oral two times a day  valsartan 40 milliGRAM(s) Oral daily  gabapentin 600 milliGRAM(s) Oral three times a day  pramipexole 0.25 milliGRAM(s) Oral at bedtime  enoxaparin Injectable 60 milliGRAM(s) SubCutaneous two times a day  furosemide   Injectable 40 milliGRAM(s) IV Push daily  toremifene 60 milliGRAM(s) Oral daily    MEDICATIONS  (PRN):  HYDROmorphone  Injectable 0.5 milliGRAM(s) IV Push three times a day PRN Moderate Pain (4 - 6)      LABS:                        8.9    6.85  )-----------( 144      ( 07 Aug 2017 08:00 )             28.0     08-07    141  |  112<H>  |  20  ----------------------------<  132<H>  3.8   |  15<L>  |  1.09    Ca    8.4      07 Aug 2017 09:06    TPro  6.2  /  Alb  3.0<L>  /  TBili  17.8<H>  /  DBili  x   /  AST  120<H>  /  ALT  78<H>  /  AlkPhos  199<H>  08-07                    Thyroid Stimulating Hormone, Serum: 3.12 uIU/mL (08-06 @ 09:02)      Cultures:    EKG:    Radiological Studies:    Consultant(s) Notes Reviewed:      Care Discussed with Consultants/Other Providers:

## 2017-08-08 NOTE — PROGRESS NOTE ADULT - SUBJECTIVE AND OBJECTIVE BOX
- Patient seen and examined.  - In summary, patient is a 88y year old woman who presented with jaundice (05 Aug 2017 18:30)  - Today, patient is without complaints.         *****MEDICATIONS:    MEDICATIONS  (STANDING):  metoprolol 25 milliGRAM(s) Oral two times a day  valsartan 40 milliGRAM(s) Oral daily  gabapentin 600 milliGRAM(s) Oral three times a day  pramipexole 0.25 milliGRAM(s) Oral at bedtime  enoxaparin Injectable 60 milliGRAM(s) SubCutaneous two times a day  furosemide   Injectable 40 milliGRAM(s) IV Push daily  toremifene 60 milliGRAM(s) Oral daily    MEDICATIONS  (PRN):  HYDROmorphone  Injectable 0.5 milliGRAM(s) IV Push three times a day PRN Moderate Pain (4 - 6)         ***** REVIEW OF SYSTEM:  GEN: no fever, no chills, no pain  RESP: no SOB, no cough, no sputum  CVS: no chest pain, no palpitations, no edema  GI: no abdominal pain, no nausea, no vomiting, no constipation, no diarrhea  : no dysurea, no frequency  NEURO: no headache, no diziness  PSYCH: no depression, not anxious  Derm : no itching, no rash         ***** VITAL SIGNS:    T(F): 97.7 (17 @ 06:33), Max: 99 (17 @ 10:44)  HR: 99 (17 @ 06:33) (85 - 102)  BP: 114/78 (17 @ 06:33) (111/77 - 135/55)  RR: 18 (17 @ 06:33) (18 - 18)  SpO2: 97% (17 @ 06:33) (97% - 100%)  Wt(kg): --  ,   I&O's Summary    07 Aug 2017 07:01  -  08 Aug 2017 07:00  --------------------------------------------------------  IN: 360 mL / OUT: 0 mL / NET: 360 mL                   *****PHYSICAL EXAM:  GEN: A&O X 3 , NAD , comfortable  HEENT: NCAT, EOMI, MMM, no icterus  NECK: Supple, No JVD  CVS: S1S2 , regular , No M/R/G appreciated  PULM: CTA B/L,  no W/R/R appreciated  ABD.: soft. non tender, non distended,  bowel sounds present  Extrem: intact pulses , no edema noted  Derm: No rash or ecchymosis noted  PSYCH: normal mood, no depression, not anxious         *****LAB AND IMAGIN.1    6.75  )-----------( 150      ( 08 Aug 2017 09:34 )             29.0               08-    142  |  111<H>  |  19  ----------------------------<  140<H>  3.8   |  17<L>  |  1.05    Ca    7.7<L>      08 Aug 2017 09:34    TPro  6.3  /  Alb  2.8<L>  /  TBili  16.3<H>  /  DBili  x   /  AST  128<H>  /  ALT  80<H>  /  AlkPhos  216<H>        [All pertinent recent Imaging/Reports reviewed]         *****A S S E S S M E N T   A N D   P L A N :    88F CHF, AF, jaundice  onc f/u  f/u MRCP  AC as able  keep O=I  TTE pending  cont current tx  ercp per GI  __________________________  JIMENEZ Red D.O. - Patient seen and examined.  - In summary, patient is a 88y year old woman who presented with jaundice (05 Aug 2017 18:30)  - Today, patient is without complaints.         *****MEDICATIONS:    MEDICATIONS  (STANDING):  metoprolol 25 milliGRAM(s) Oral two times a day  valsartan 40 milliGRAM(s) Oral daily  gabapentin 600 milliGRAM(s) Oral three times a day  pramipexole 0.25 milliGRAM(s) Oral at bedtime  enoxaparin Injectable 60 milliGRAM(s) SubCutaneous two times a day  furosemide   Injectable 40 milliGRAM(s) IV Push daily  toremifene 60 milliGRAM(s) Oral daily    MEDICATIONS  (PRN):  HYDROmorphone  Injectable 0.5 milliGRAM(s) IV Push three times a day PRN Moderate Pain (4 - 6)         ***** REVIEW OF SYSTEM:  GEN: no fever, no chills, no pain  RESP: no SOB, no cough, no sputum  CVS: no chest pain, no palpitations, no edema  GI: no abdominal pain, no nausea, no vomiting, no constipation, no diarrhea  : no dysurea, no frequency  NEURO: no headache, no diziness  PSYCH: no depression, not anxious  Derm : no itching, no rash         ***** VITAL SIGNS:    T(F): 97.7 (17 @ 06:33), Max: 99 (17 @ 10:44)  HR: 99 (17 @ 06:33) (85 - 102)  BP: 114/78 (17 @ 06:33) (111/77 - 135/55)  RR: 18 (17 @ 06:33) (18 - 18)  SpO2: 97% (17 @ 06:33) (97% - 100%)  Wt(kg): --  ,   I&O's Summary    07 Aug 2017 07:01  -  08 Aug 2017 07:00  --------------------------------------------------------  IN: 360 mL / OUT: 0 mL / NET: 360 mL                   *****PHYSICAL EXAM:  GEN: A&O X 3 , NAD , comfortable  HEENT: NCAT, EOMI, MMM, no icterus  NECK: Supple, No JVD  CVS: S1S2 , regular , No M/R/G appreciated  PULM: CTA B/L,  no W/R/R appreciated  ABD.: soft. non tender, non distended,  bowel sounds present  Extrem: intact pulses , no edema noted  Derm: No rash or ecchymosis noted  PSYCH: normal mood, no depression, not anxious         *****LAB AND IMAGIN.1    6.75  )-----------( 150      ( 08 Aug 2017 09:34 )             29.0               08-    142  |  111<H>  |  19  ----------------------------<  140<H>  3.8   |  17<L>  |  1.05    Ca    7.7<L>      08 Aug 2017 09:34    TPro  6.3  /  Alb  2.8<L>  /  TBili  16.3<H>  /  DBili  x   /  AST  128<H>  /  ALT  80<H>  /  AlkPhos  216<H>        [All pertinent recent Imaging/Reports reviewed]         *****A S S E S S M E N T   A N D   P L A N :    88F CHF, AF, jaundice  onc f/u  f/u MRCP  AC as able  keep O=I  TTE pending  cont current tx  ercp per GI  As focus of tx is largely palliative at this point and patient is likely not a candidate for any aggressive cardiac treatment and has had no recent cardiac symptoms ercp can be undertaken at moderate risk.      __________________________  JIMENEZ Red D.O.

## 2017-08-09 LAB
ALBUMIN SERPL ELPH-MCNC: 2.7 G/DL — LOW (ref 3.3–5)
ALP SERPL-CCNC: 190 U/L — HIGH (ref 40–120)
ALT FLD-CCNC: 82 U/L — HIGH (ref 10–45)
ANION GAP SERPL CALC-SCNC: 17 MMOL/L — SIGNIFICANT CHANGE UP (ref 5–17)
AST SERPL-CCNC: 128 U/L — HIGH (ref 10–40)
BASOPHILS # BLD AUTO: 0.03 K/UL — SIGNIFICANT CHANGE UP (ref 0–0.2)
BASOPHILS NFR BLD AUTO: 0.5 % — SIGNIFICANT CHANGE UP (ref 0–2)
BILIRUB SERPL-MCNC: 9.2 MG/DL — HIGH (ref 0.2–1.2)
BUN SERPL-MCNC: 27 MG/DL — HIGH (ref 7–23)
CALCIUM SERPL-MCNC: 7.4 MG/DL — LOW (ref 8.4–10.5)
CHLORIDE SERPL-SCNC: 113 MMOL/L — HIGH (ref 96–108)
CO2 SERPL-SCNC: 15 MMOL/L — LOW (ref 22–31)
CREAT SERPL-MCNC: 0.99 MG/DL — SIGNIFICANT CHANGE UP (ref 0.5–1.3)
EOSINOPHIL # BLD AUTO: 0.06 K/UL — SIGNIFICANT CHANGE UP (ref 0–0.5)
EOSINOPHIL NFR BLD AUTO: 1.1 % — SIGNIFICANT CHANGE UP (ref 0–6)
GLUCOSE SERPL-MCNC: 147 MG/DL — HIGH (ref 70–99)
HCT VFR BLD CALC: 28.8 % — LOW (ref 34.5–45)
HGB BLD-MCNC: 9 G/DL — LOW (ref 11.5–15.5)
IMM GRANULOCYTES NFR BLD AUTO: 0.2 % — SIGNIFICANT CHANGE UP (ref 0–1.5)
LYMPHOCYTES # BLD AUTO: 0.95 K/UL — LOW (ref 1–3.3)
LYMPHOCYTES # BLD AUTO: 16.9 % — SIGNIFICANT CHANGE UP (ref 13–44)
MCHC RBC-ENTMCNC: 28.1 PG — SIGNIFICANT CHANGE UP (ref 27–34)
MCHC RBC-ENTMCNC: 31.3 GM/DL — LOW (ref 32–36)
MCV RBC AUTO: 90 FL — SIGNIFICANT CHANGE UP (ref 80–100)
MONOCYTES # BLD AUTO: 0.38 K/UL — SIGNIFICANT CHANGE UP (ref 0–0.9)
MONOCYTES NFR BLD AUTO: 6.8 % — SIGNIFICANT CHANGE UP (ref 2–14)
NEUTROPHILS # BLD AUTO: 4.18 K/UL — SIGNIFICANT CHANGE UP (ref 1.8–7.4)
NEUTROPHILS NFR BLD AUTO: 74.5 % — SIGNIFICANT CHANGE UP (ref 43–77)
PLATELET # BLD AUTO: 163 K/UL — SIGNIFICANT CHANGE UP (ref 150–400)
POTASSIUM SERPL-MCNC: 4.2 MMOL/L — SIGNIFICANT CHANGE UP (ref 3.5–5.3)
POTASSIUM SERPL-SCNC: 4.2 MMOL/L — SIGNIFICANT CHANGE UP (ref 3.5–5.3)
PROT SERPL-MCNC: 5.9 G/DL — LOW (ref 6–8.3)
RBC # BLD: 3.2 M/UL — LOW (ref 3.8–5.2)
RBC # FLD: 23.1 % — HIGH (ref 10.3–14.5)
SODIUM SERPL-SCNC: 145 MMOL/L — SIGNIFICANT CHANGE UP (ref 135–145)
WBC # BLD: 5.61 K/UL — SIGNIFICANT CHANGE UP (ref 3.8–10.5)
WBC # FLD AUTO: 5.61 K/UL — SIGNIFICANT CHANGE UP (ref 3.8–10.5)

## 2017-08-09 PROCEDURE — 99232 SBSQ HOSP IP/OBS MODERATE 35: CPT | Mod: GC

## 2017-08-09 RX ORDER — METOPROLOL TARTRATE 50 MG
1 TABLET ORAL
Qty: 0 | Refills: 0 | COMMUNITY
Start: 2017-08-09

## 2017-08-09 RX ORDER — VALSARTAN 80 MG/1
1 TABLET ORAL
Qty: 0 | Refills: 0 | COMMUNITY
Start: 2017-08-09

## 2017-08-09 RX ORDER — TOREMIFENE CITRATE 60 MG/1
1 TABLET ORAL
Qty: 0 | Refills: 0 | COMMUNITY
Start: 2017-08-09

## 2017-08-09 RX ORDER — PRAMIPEXOLE DIHYDROCHLORIDE 0.12 MG/1
1 TABLET ORAL
Qty: 0 | Refills: 0 | COMMUNITY
Start: 2017-08-09

## 2017-08-09 RX ORDER — GABAPENTIN 400 MG/1
1 CAPSULE ORAL
Qty: 0 | Refills: 0 | COMMUNITY
Start: 2017-08-09

## 2017-08-09 RX ADMIN — GABAPENTIN 600 MILLIGRAM(S): 400 CAPSULE ORAL at 22:12

## 2017-08-09 RX ADMIN — ENOXAPARIN SODIUM 60 MILLIGRAM(S): 100 INJECTION SUBCUTANEOUS at 06:38

## 2017-08-09 RX ADMIN — VALSARTAN 40 MILLIGRAM(S): 80 TABLET ORAL at 06:37

## 2017-08-09 RX ADMIN — ENOXAPARIN SODIUM 60 MILLIGRAM(S): 100 INJECTION SUBCUTANEOUS at 18:11

## 2017-08-09 RX ADMIN — GABAPENTIN 600 MILLIGRAM(S): 400 CAPSULE ORAL at 13:01

## 2017-08-09 RX ADMIN — Medication 25 MILLIGRAM(S): at 06:37

## 2017-08-09 RX ADMIN — Medication 25 MILLIGRAM(S): at 18:11

## 2017-08-09 RX ADMIN — GABAPENTIN 600 MILLIGRAM(S): 400 CAPSULE ORAL at 06:37

## 2017-08-09 RX ADMIN — PRAMIPEXOLE DIHYDROCHLORIDE 0.25 MILLIGRAM(S): 0.12 TABLET ORAL at 22:12

## 2017-08-09 RX ADMIN — Medication 40 MILLIGRAM(S): at 06:38

## 2017-08-09 RX ADMIN — TOREMIFENE CITRATE 60 MILLIGRAM(S): 60 TABLET ORAL at 13:00

## 2017-08-09 NOTE — PROGRESS NOTE ADULT - ATTENDING COMMENTS
Agree with above  Will need repeat ERCP in near future. Will allow for complete biliary decompression and healing from sphincterotomy.   Will require pancreatic duct access and further evaluation of distal ampullary stenosis.

## 2017-08-09 NOTE — PROGRESS NOTE ADULT - ASSESSMENT
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 s/p ERCP with sphincterotomy and fully covered metal stent. Clinically stable.    PLAN:  - advance diet as tolerated  - monitor LFTs  - resume pradaxa tomorrow   - repeat EUS/ERCP in 4-6 weeks as outpatient for stent removal and further evaluation of pancreas duct.

## 2017-08-09 NOTE — DISCHARGE NOTE ADULT - CARE PROVIDER_API CALL
Pedro Mendenhall), Gastroenterology; Internal Medicine  [address] [place] [address]  Phone: [phone]  Fax: [phone]

## 2017-08-09 NOTE — PHYSICAL THERAPY INITIAL EVALUATION ADULT - CRITERIA FOR SKILLED THERAPEUTIC INTERVENTIONS
risk reduction/prevention/rehab potential/impairments found/functional limitations in following categories/therapy frequency/anticipated equipment needs at discharge/anticipated discharge recommendation/predicted duration of therapy intervention

## 2017-08-09 NOTE — DISCHARGE NOTE ADULT - PLAN OF CARE
Had ERCP with sphincterotomy and fully covered metal stent on 8/8.   Followup with your doctor for Liver enzymes monitoring Weigh yourself daily.  If you gain 3lbs in 3 days, or 5lbs in a week call your Health Care Provider.  Do not eat or drink foods containing more than 2000mg of salt (sodium) in your diet every day.  Call your Health Care Provider if you have any swelling or increased swelling in your feet, ankles, and/or stomach.  Take all of your medication as directed.  If you become dizzy call your Health Care Provider. Continue with Pradaxa as prescribed Liver enzymes remains within normal range

## 2017-08-09 NOTE — PHYSICAL THERAPY INITIAL EVALUATION ADULT - ADDITIONAL COMMENTS
88 year old woman who PTA was living in  with  (who recently passed away). PTA pt was indpendent in all functional mobility and all ADL's.. Pt ambulated with a cane however owns a RW and all DME necessary upon DC. upon DC pt plans to stay at [facility] in [facility] reports flight of stairs to 2nd floor bedroom + hand rail.

## 2017-08-09 NOTE — DISCHARGE NOTE ADULT - MEDICATION SUMMARY - MEDICATIONS TO TAKE
I will START or STAY ON the medications listed below when I get home from the hospital:    dabigatran 75 mg oral capsule  -- 1 cap(s) by mouth 2 times a day  -- Indication: For atrial fibrillation    gabapentin 600 mg oral tablet  -- 1 tab(s) by mouth 3 times a day  -- Indication: For pain    toremifene 60 mg oral tablet  -- 1 tab(s) by mouth once a day  -- Indication: For breast cancer    pramipexole 0.25 mg oral tablet  -- 1 tab(s) by mouth once a day (at bedtime)  -- Indication: For restless leg syndrome    metoprolol tartrate 25 mg oral tablet  -- 1 tab(s) by mouth 2 times a day  -- Indication: For blood pressure    furosemide 20 mg oral tablet  -- 1 tab(s) by mouth once a day  -- Indication: For CHF (congestive heart failure)

## 2017-08-09 NOTE — PROGRESS NOTE ADULT - SUBJECTIVE AND OBJECTIVE BOX
Chief Complaint:  Patient is a 88y old  Female who presents with a chief complaint of jaundice (05 Aug 2017 18:30)      Interval Events:     Allergies:  penicillins (Rash)      Home Medications:    Hospital Medications:  metoprolol 25 milliGRAM(s) Oral two times a day  valsartan 40 milliGRAM(s) Oral daily  gabapentin 600 milliGRAM(s) Oral three times a day  pramipexole 0.25 milliGRAM(s) Oral at bedtime  HYDROmorphone  Injectable 0.5 milliGRAM(s) IV Push three times a day PRN  enoxaparin Injectable 60 milliGRAM(s) SubCutaneous two times a day  furosemide   Injectable 40 milliGRAM(s) IV Push daily  toremifene 60 milliGRAM(s) Oral daily      PMHX/PSHX:      Family history:      ROS: as per HPI       PHYSICAL EXAM:   Vital Signs:  Vital Signs Last 24 Hrs  T(C): 36.3 (09 Aug 2017 04:45), Max: 36.9 (08 Aug 2017 23:40)  T(F): 97.4 (09 Aug 2017 04:45), Max: 98.4 (08 Aug 2017 23:40)  HR: 92 (09 Aug 2017 04:45) (90 - 110)  BP: 104/70 (09 Aug 2017 04:45) (89/54 - 115/77)  BP(mean): 85 (08 Aug 2017 22:45) (67 - 85)  RR: 18 (09 Aug 2017 04:45) (16 - 18)  SpO2: 99% (09 Aug 2017 04:45) (95% - 100%)  Daily     Daily Weight in k.6 (09 Aug 2017 04:45)    GENERAL:  NAD  HEENT: sclera -anicteric  CHEST:  breath sounds clear  HEART:  Regular rhythm  ABDOMEN:  Soft, non-tender, non-distended, normoactive bowel sounds  SKIN:  improving jaundice   NEURO:  Alert, oriented    LABS:                        9.1    6.75  )-----------( 150      ( 08 Aug 2017 09:34 )             29.0     08-08    142  |  111<H>  |  19  ----------------------------<  140<H>  3.8   |  17<L>  |  1.05    Ca    7.7<L>      08 Aug 2017 09:34    TPro  6.3  /  Alb  2.8<L>  /  TBili  16.3<H>  /  DBili  x   /  AST  128<H>  /  ALT  80<H>  /  AlkPhos  216<H>      LIVER FUNCTIONS - ( 08 Aug 2017 09:34 )  Alb: 2.8 g/dL / Pro: 6.3 g/dL / ALK PHOS: 216 U/L / ALT: 80 U/L / AST: 128 U/L / GGT: x                   Imaging:    ____________________________________________________________________________________________________     Procedure:           Upper EUS  Indications:         Common bile duct dilation and pancreatic duct on MRCP  Providers:           MARITZA DIAZ MD, Rohini Delcid MD (Fellow)  Medicines:           General Anesthesia  Complications:       No immediate complications.  ____________________________________________________________________________________________________  Procedure:           After obtaining informed consent, the endoscope was passed under direct                        vision. Throughout the procedure, the patient's blood pressure, pulse, and                        oxygen saturations were monitored continuously. The was introduced through                        the mouth, and advanced to the second part of duodenum. After obtaining                        informed consent, the endoscope was passed under direct vision. Throughout                        the procedure, the patient's blood pressure, pulse, and oxygen saturations                        were monitored continuously. The upper EUS was accomplished without                        difficulty. The patient tolerated the procedure well.                                                                                                        Findings:       Endosonographic Finding :       A linear echoendoscope was advanced to the second portion of the duodenum.        Endosonographically, the pancreas was visualized and appeared sonographically normal. The        pancreatic duct and side branches were markedly dilated, measuring 1.3 cm in diameter. The        bilary duct measured 1.7 cm in diameter. No discrete mass was seen in the pancreas.       Both the CBD and PD could be traced to the papilla without an obvious mass.       There was a question of intraductal poylpoid material of the PD.       The SMV/PV/SVand SMA appeared normal with normal fat planes and no evidence of any        infiltration.                                                                                                        Impression:          - No specimens collected.                       - There was dilation in the common bile duct which measured up to 17 mm.                       - The pancreatic duct had a dilated endosonographic appearance in the                        pancreatic head.  Recommendation:      - Perform an ERCP today.                                                                                                          _________________  MARITZA DIAZ MD  2017 5:40:05 PM  This report has been signed electronically.  Number of Addenda: 0    Note Initiated On: 2017 3:42 PM      [facility]  ____________________________________________________________________________________________________  Patient Name: Dede Mcclure                    MRN: [id number]  Account Number: [id number]                     YOB: 1929  Room: Endoscopy Room 2                           Gender: Female  Attending MD: MARITZA DIAZ MD                 Procedure Date No Time: 2017  ____________________________________________________________________________________________________     Procedure:           ERCP  Indications:         88 year old female with history of jaundice and Abnormal MRCP revealing                        dilated CBD and pancreatic duct. No mass seen on EUS.  Providers:           MARITZA DIAZ MD, Rohini Delcid MD (Fellow)  Medicines:           General Anesthesia  Complications:       No immediate complications.  ____________________________________________________________________________________________________  Procedure:           Pre-Anesthesia Assessment:                       - Prior to the procedure, a History and Physical was performed, and patient                        medications, allergies and sensitivities were reviewed. The patient's                        tolerance of previous anesthesia was reviewed.                       - The risks and benefits of the procedure and the sedation options and risks                        were discussed with the patient. All questions were answered and informed                        consent was obtained.                       - Patient identification and proposed procedure were verified prior to the                        procedure by the physician, the nurse and the anesthesiologist. The procedure                        was verified in the procedure room.                       After obtaining informed consent, the scope was passed under direct vision.                        Throughout the procedure, the patient's blood pressure, pulse, and oxygen                        saturations were monitored continuously. The side viewing ercp was introduced                        through the mouth, and advanced to the duodenum and used to inject contrast                        into the bile duct. The ERCP was accomplished without difficulty. The patient                        tolerated the procedure well.                                                                                                        Findings:       The  film was normal. The esophagus was successfully intubated under direct vision. The        scope was advanced to a normal major papilla in the descending duodenum without detailed        examination of the pharynx, larynx and associated structures, and upper GI tract. The upper        GI tract was grossly normal. The bile duct was deeply cannulated with an RX 44 sphinctertome        pre loaded with a hydra jagwire. Contrast was injected. I personally interpreted the bile        duct images. There was brisk flow of contrast through the ducts. Image quality was excellent.        Contrast extended to the bifurcation. Opacification of the entire opacified area was        successful. The common bile duct was markedly dilated up to a maximum diameter of 17 mm. A        very short stricture at the level of the insertion of the bile duct into the duodenum was        identified. A biliary sphincterotomy was performed successfully. Attempted PD cannulation was        not successful. Finally, a 10 mm x 60 mm covered metal stent was deployed into the common        bile duct using flouroscopic guidance.                                                                                                        Impression:          Short distal biliary/ampullary stricture s/p ERCP with sphincterotomy and                        placement of covered metal stent.                       Obstructed PD- unable to gain access but patient remains asymptomatic.                       There is no evidence of an infiltrating mass on imaging, or EUS and this is                        supproted by the cholangiogram findings.                       One etiology can be that she had a pre existing main duct IPMN, with this                        either directly causing obstruction of the bile duct or with some element of                        transformation of the IPMN low down near the common channel.                       This can be further assessed in the future with a more agressive attempt at                        pancreatic access.  Recommendation:      - [facility].                       - Resume Pradaxa (dabigatran) at prior dose in 2 days.                       - Observe patient's clinical course. Can resume liquid diet, and advance diet                        as tolerated tomorrow.                       - Monitor for signs/symptoms of pancreatitis.                                                                                                        Attending Participation:       I was present and participated during the entire procedure, including non-key portions.                                                                                                          _________________  MARITZA DIAZ MD  2017 5:47:00 PM  This report has been signed electronically.  Number of Addenda: 0    Note Initiated On: 2017 4:34 PM Chief Complaint:  Patient is a 88y old  Female who presents with a chief complaint of jaundice (05 Aug 2017 18:30)      Interval Events: s/p EUS/ERCP yesterday with biliary stent placement. No acute events overnight. Afebrile. Feels better. No abdominal pain/nausea/vomiting.     Allergies:  penicillins (Rash)      Home Medications:    Hospital Medications:  metoprolol 25 milliGRAM(s) Oral two times a day  valsartan 40 milliGRAM(s) Oral daily  gabapentin 600 milliGRAM(s) Oral three times a day  pramipexole 0.25 milliGRAM(s) Oral at bedtime  HYDROmorphone  Injectable 0.5 milliGRAM(s) IV Push three times a day PRN  enoxaparin Injectable 60 milliGRAM(s) SubCutaneous two times a day  furosemide   Injectable 40 milliGRAM(s) IV Push daily  toremifene 60 milliGRAM(s) Oral daily      PMHX/PSHX:      Family history:      ROS: as per HPI       PHYSICAL EXAM:   Vital Signs:  Vital Signs Last 24 Hrs  T(C): 36.3 (09 Aug 2017 04:45), Max: 36.9 (08 Aug 2017 23:40)  T(F): 97.4 (09 Aug 2017 04:45), Max: 98.4 (08 Aug 2017 23:40)  HR: 92 (09 Aug 2017 04:45) (90 - 110)  BP: 104/70 (09 Aug 2017 04:45) (89/54 - 115/77)  BP(mean): 85 (08 Aug 2017 22:45) (67 - 85)  RR: 18 (09 Aug 2017 04:45) (16 - 18)  SpO2: 99% (09 Aug 2017 04:45) (95% - 100%)  Daily     Daily Weight in k.6 (09 Aug 2017 04:45)    GENERAL:  NAD  HEENT: sclera -anicteric  CHEST:  breath sounds clear  HEART:  Regular rhythm  ABDOMEN:  Soft, non-tender, non-distended, normoactive bowel sounds  SKIN:  improving jaundice   NEURO:  Alert, oriented    LABS:                        9.1    6.75  )-----------( 150      ( 08 Aug 2017 09:34 )             29.0     08-08    142  |  111<H>  |  19  ----------------------------<  140<H>  3.8   |  17<L>  |  1.05    Ca    7.7<L>      08 Aug 2017 09:34    TPro  6.3  /  Alb  2.8<L>  /  TBili  16.3<H>  /  DBili  x   /  AST  128<H>  /  ALT  80<H>  /  AlkPhos  216<H>      LIVER FUNCTIONS - ( 08 Aug 2017 09:34 )  Alb: 2.8 g/dL / Pro: 6.3 g/dL / ALK PHOS: 216 U/L / ALT: 80 U/L / AST: 128 U/L / GGT: x                   Imaging:    ____________________________________________________________________________________________________     Procedure:           Upper EUS  Indications:         Common bile duct dilation and pancreatic duct on MRCP  Providers:           MARITZA DIAZ MD, Rohini Delcid MD (Fellow)  Medicines:           General Anesthesia  Complications:       No immediate complications.  ____________________________________________________________________________________________________  Procedure:           After obtaining informed consent, the endoscope was passed under direct                        vision. Throughout the procedure, the patient's blood pressure, pulse, and                        oxygen saturations were monitored continuously. The was introduced through                        the mouth, and advanced to the second part of duodenum. After obtaining                        informed consent, the endoscope was passed under direct vision. Throughout                        the procedure, the patient's blood pressure, pulse, and oxygen saturations                        were monitored continuously. The upper EUS was accomplished without                        difficulty. The patient tolerated the procedure well.                                                                                                        Findings:       Endosonographic Finding :       A linear echoendoscope was advanced to the second portion of the duodenum.        Endosonographically, the pancreas was visualized and appeared sonographically normal. The        pancreatic duct and side branches were markedly dilated, measuring 1.3 cm in diameter. The        bilary duct measured 1.7 cm in diameter. No discrete mass was seen in the pancreas.       Both the CBD and PD could be traced to the papilla without an obvious mass.       There was a question of intraductal poylpoid material of the PD.       The SMV/PV/SVand SMA appeared normal with normal fat planes and no evidence of any        infiltration.                                                                                                        Impression:          - No specimens collected.                       - There was dilation in the common bile duct which measured up to 17 mm.                       - The pancreatic duct had a dilated endosonographic appearance in the                        pancreatic head.  Recommendation:      - Perform an ERCP today.                                                                                                          _________________  MARITZA DIAZ MD  2017 5:40:05 PM  This report has been signed electronically.  Number of Addenda: 0    Note Initiated On: 2017 3:42 PM      [facility]  ____________________________________________________________________________________________________  Patient Name: Dede Mcclure                    MRN: [id number]  Account Number: [id number]                     YOB: 1929  Room: Endoscopy Room 2                           Gender: Female  Attending MD: MARITZA DIAZ MD                 Procedure Date No Time: 2017  ____________________________________________________________________________________________________     Procedure:           ERCP  Indications:         88 year old female with history of jaundice and Abnormal MRCP revealing                        dilated CBD and pancreatic duct. No mass seen on EUS.  Providers:           MARITZA DIAZ MD, Rohini Delcid MD (Fellow)  Medicines:           General Anesthesia  Complications:       No immediate complications.  ____________________________________________________________________________________________________  Procedure:           Pre-Anesthesia Assessment:                       - Prior to the procedure, a History and Physical was performed, and patient                        medications, allergies and sensitivities were reviewed. The patient's                        tolerance of previous anesthesia was reviewed.                       - The risks and benefits of the procedure and the sedation options and risks                        were discussed with the patient. All questions were answered and informed                        consent was obtained.                       - Patient identification and proposed procedure were verified prior to the                        procedure by the physician, the nurse and the anesthesiologist. The procedure                        was verified in the procedure room.                       After obtaining informed consent, the scope was passed under direct vision.                        Throughout the procedure, the patient's blood pressure, pulse, and oxygen                        saturations were monitored continuously. The side viewing ercp was introduced                        through the mouth, and advanced to the duodenum and used to inject contrast                        into the bile duct. The ERCP was accomplished without difficulty. The patient                        tolerated the procedure well.                                                                                                        Findings:       The  film was normal. The esophagus was successfully intubated under direct vision. The        scope was advanced to a normal major papilla in the descending duodenum without detailed        examination of the pharynx, larynx and associated structures, and upper GI tract. The upper        GI tract was grossly normal. The bile duct was deeply cannulated with an RX 44 sphinctertome        pre loaded with a hydra jagwire. Contrast was injected. I personally interpreted the bile        duct images. There was brisk flow of contrast through the ducts. Image quality was excellent.        Contrast extended to the bifurcation. Opacification of the entire opacified area was        successful. The common bile duct was markedly dilated up to a maximum diameter of 17 mm. A        very short stricture at the level of the insertion of the bile duct into the duodenum was        identified. A biliary sphincterotomy was performed successfully. Attempted PD cannulation was        not successful. Finally, a 10 mm x 60 mm covered metal stent was deployed into the common        bile duct using flouroscopic guidance.                                                                                                        Impression:          Short distal biliary/ampullary stricture s/p ERCP with sphincterotomy and                        placement of covered metal stent.                       Obstructed PD- unable to gain access but patient remains asymptomatic.                       There is no evidence of an infiltrating mass on imaging, or EUS and this is                        supproted by the cholangiogram findings.                       One etiology can be that she had a pre existing main duct IPMN, with this                        either directly causing obstruction of the bile duct or with some element of                        transformation of the IPMN low down near the common channel.                       This can be further assessed in the future with a more agressive attempt at                        pancreatic access.  Recommendation:      - Return patient to [facility].                       - Resume Pradaxa (dabigatran) at prior dose in 2 days.                       - Observe patient's clinical course. Can resume liquid diet, and advance diet                        as tolerated tomorrow.                       - Monitor for signs/symptoms of pancreatitis.                                                                                                        Attending Participation:       I was present and participated during the entire procedure, including non-key portions.                                                                                                          _________________  MARITZA DIAZ MD  2017 5:47:00 PM  This report has been signed electronically.  Number of Addenda: 0    Note Initiated On: 2017 4:34 PM

## 2017-08-09 NOTE — PHYSICAL THERAPY INITIAL EVALUATION ADULT - PRECAUTIONS/LIMITATIONS, REHAB EVAL
Echo 8/7: Moderate-severe mitral regurgitation. mild global left ventricular systolic dysfunction./fall precautions

## 2017-08-09 NOTE — DISCHARGE NOTE ADULT - PATIENT PORTAL LINK FT
“You can access the [employer] Patient Portal, offered by [facility], by registering with the following website: [email or web address]”

## 2017-08-09 NOTE — DISCHARGE NOTE ADULT - HOSPITAL COURSE
md PAINLESS  JUANDICE    PT  WITH  H/O CA  BREAST  AND  LYMPHOMA  IN THE  PAST    SEN BY  [facility],  S/P  ERCP/  EUS, NO  MASS,  BILIARY  STENT P LACED,  WITH  RESOLVING  JAUNDICE    BILIRUBIN IS  6  NOW, F/P  WITH  GI  IN  [place],  WHERE  PT  LIVES

## 2017-08-09 NOTE — PHYSICAL THERAPY INITIAL EVALUATION ADULT - PERTINENT HX OF CURRENT PROBLEM, REHAB EVAL
patient is a 88y year old woman who presented with jaundice Pt seen at [place] found to have severe biliary and pancreatic ductal dilation implying stricture at the common ampulla. transferred to see GI. Pt is s/p ERCP proceedure on 8/8

## 2017-08-09 NOTE — PROGRESS NOTE ADULT - ASSESSMENT
s/p  stent, with marked decrease in lfts   follow lfts in am    eus, no  mass  noted    plan,  dc in  1 to2  days

## 2017-08-09 NOTE — PROGRESS NOTE ADULT - SUBJECTIVE AND OBJECTIVE BOX
SUBJECTIVE / OVERNIGHT EVENTS: No nausea, vomiting or diarrhea, no fever or chills, no dizziness or chest pain, no dysuria or hematuria .      CAPILLARY BLOOD GLUCOSE        I&O's Summary    08 Aug 2017 07:01  -  09 Aug 2017 07:00  --------------------------------------------------------  IN: 300 mL / OUT: 0 mL / NET: 300 mL        PHYSICAL EXAM:  HEAD:  Atraumatic, Normocephalic  EYES: EOMI, PERRLA, conjunctiva and sclera clear  NECK: Supple, No JVD  CHEST/LUNG: Clear to auscultation bilaterally; No wheeze  HEART: Regular rate and rhythm; No murmurs, rubs, or gallops  ABDOMEN: Soft, Nontender, Nondistended; Bowel sounds present  EXTREMITIES:  2+ Peripheral Pulses, No clubbing, cyanosis, or edema  PSYCH: AAOx3  NEUROLOGY: non-focal  SKIN: No rashes or lesions    MEDICATIONS  (STANDING):  metoprolol 25 milliGRAM(s) Oral two times a day  valsartan 40 milliGRAM(s) Oral daily  gabapentin 600 milliGRAM(s) Oral three times a day  pramipexole 0.25 milliGRAM(s) Oral at bedtime  enoxaparin Injectable 60 milliGRAM(s) SubCutaneous two times a day  furosemide   Injectable 40 milliGRAM(s) IV Push daily  toremifene 60 milliGRAM(s) Oral daily    MEDICATIONS  (PRN):  HYDROmorphone  Injectable 0.5 milliGRAM(s) IV Push three times a day PRN Moderate Pain (4 - 6)      LABS:                        9.0    5.61  )-----------( 163      ( 09 Aug 2017 08:00 )             28.8     08-09    145  |  113<H>  |  27<H>  ----------------------------<  147<H>  4.2   |  15<L>  |  0.99    Ca    7.4<L>      09 Aug 2017 08:00    TPro  5.9<L>  /  Alb  2.7<L>  /  TBili  9.2<H>  /  DBili  x   /  AST  128<H>  /  ALT  82<H>  /  AlkPhos  190<H>  08-09                    Thyroid Stimulating Hormone, Serum: 3.12 uIU/mL (08-06 @ 09:02)      Cultures:    EKG:    Radiological Studies:    Consultant(s) Notes Reviewed:      Care Discussed with Consultants/Other Providers:

## 2017-08-09 NOTE — PROGRESS NOTE ADULT - SUBJECTIVE AND OBJECTIVE BOX
- Patient seen and examined.  - In summary, patient is a 88y year old woman who presented with jaundice (05 Aug 2017 18:30)  - Today, patient is without complaints.         *****MEDICATIONS:    MEDICATIONS  (STANDING):  metoprolol 25 milliGRAM(s) Oral two times a day  valsartan 40 milliGRAM(s) Oral daily  gabapentin 600 milliGRAM(s) Oral three times a day  pramipexole 0.25 milliGRAM(s) Oral at bedtime  enoxaparin Injectable 60 milliGRAM(s) SubCutaneous two times a day  furosemide   Injectable 40 milliGRAM(s) IV Push daily  toremifene 60 milliGRAM(s) Oral daily    MEDICATIONS  (PRN):  HYDROmorphone  Injectable 0.5 milliGRAM(s) IV Push three times a day PRN Moderate Pain (4 - 6)         ***** REVIEW OF SYSTEM:  GEN: no fever, no chills, no pain  RESP: no SOB, no cough, no sputum  CVS: no chest pain, no palpitations, no edema  GI: no abdominal pain, no nausea, no vomiting, no constipation, no diarrhea  : no dysurea, no frequency  NEURO: no headache, no diziness  PSYCH: no depression, not anxious  Derm : no itching, no rash         ***** VITAL SIGNS:    T(F): 97.4 (17 @ 04:45), Max: 98.4 (17 @ 23:40)  HR: 92 (17 @ 04:45) (90 - 110)  BP: 104/70 (17 @ 04:45) (89/54 - 115/77)  RR: 18 (17 @ 04:45) (16 - 18)  SpO2: 99% (17 @ 04:45) (95% - 100%)  Wt(kg): --  ,   I&O's Summary    08 Aug 2017 07:01  -  09 Aug 2017 07:00  --------------------------------------------------------  IN: 300 mL / OUT: 0 mL / NET: 300 mL             *****PHYSICAL EXAM:  GEN: A&O X 3 , NAD , comfortable  HEENT: NCAT, EOMI, MMM, no icterus  NECK: Supple, No JVD  CVS: S1S2 , regular , No M/R/G appreciated  PULM: CTA B/L,  no W/R/R appreciated  ABD.: soft. non tender, non distended,  bowel sounds present  Extrem: intact pulses , no edema noted  Derm: No rash or ecchymosis noted  PSYCH: normal mood, no depression, not anxious         *****LAB AND IMAGIN.0    5.61  )-----------( 163      ( 09 Aug 2017 08:00 )             28.8                   145  |  113<H>  |  27<H>  ----------------------------<  147<H>  4.2   |  15<L>  |  0.99    Ca    7.4<L>      09 Aug 2017 08:00    TPro  5.9<L>  /  Alb  2.7<L>  /  TBili  9.2<H>  /  DBili  x   /  AST  128<H>  /  ALT  82<H>  /  AlkPhos  190<H>        [All pertinent recent Imaging/Reports reviewed]    < from: Transthoracic Echocardiogram (17 @ 14:23) >  Conclusions:  1. Mitral annular calcification. Tethered mitral valve  leaflets. Moderate-severe mitral regurgitation. Mean  transmitral valve gradient equals 2-3 mm Hg, consistent  with mild mitral stenosis.  2. Aortic valve not well visualized; appears calcified.  Minimal aortic regurgitation.  3. Endocardium not well visualized; grossly mild global  left ventricular systolic dysfunction. There is a false  tendon in the LV cavity (normal variant). Septal flattening  consistent with right ventricular overload.  4. Right ventricular enlargement with decreased right  ventricular systolic function. A device wire is noted in  the right heart.  5. Tricuspid annular dilataton with normal leaflet opening.  Severe tricuspid regurgitation.  6. Estimated pulmonary artery systolic pressure equals 57  mm Hg, assuming right atrial pressure equals 8 mm Hg,  consistent with moderate pulmonary pressures.    < end of copied text >         *****A S S E S S M E N T   A N D   P L A N :    88F CHF, AF, jaundice  GI f/u noted  AC as able  TTE as noted  keep O=I  cont current tx      __________________________  A. ESTHER Red.

## 2017-08-09 NOTE — DISCHARGE NOTE ADULT - HOME CARE AGENCY
[facility] [phone]  [place]  RN will call prior to visit   Services to start day following discharge   start of care 8/11/2017

## 2017-08-09 NOTE — DISCHARGE NOTE ADULT - CARE PLAN
Principal Discharge DX:	Jaundice  Instructions for follow-up, activity and diet:	Had ERCP with sphincterotomy and fully covered metal stent on 8/8.   Followup with your doctor for Liver enzymes monitoring  Secondary Diagnosis:	Chronic atrial fibrillation  Instructions for follow-up, activity and diet:	Continue with Pradaxa as prescribed  Secondary Diagnosis:	CHF (congestive heart failure)  Instructions for follow-up, activity and diet:	Weigh yourself daily.  If you gain 3lbs in 3 days, or 5lbs in a week call your Health Care Provider.  Do not eat or drink foods containing more than 2000mg of salt (sodium) in your diet every day.  Call your Health Care Provider if you have any swelling or increased swelling in your feet, ankles, and/or stomach.  Take all of your medication as directed.  If you become dizzy call your Health Care Provider. Principal Discharge DX:	Jaundice  Goal:	Liver enzymes remains within normal range  Instructions for follow-up, activity and diet:	Had ERCP with sphincterotomy and fully covered metal stent on 8/8.   Followup with your doctor for Liver enzymes monitoring  Secondary Diagnosis:	Chronic atrial fibrillation  Instructions for follow-up, activity and diet:	Continue with Pradaxa as prescribed  Secondary Diagnosis:	CHF (congestive heart failure)  Instructions for follow-up, activity and diet:	Weigh yourself daily.  If you gain 3lbs in 3 days, or 5lbs in a week call your Health Care Provider.  Do not eat or drink foods containing more than 2000mg of salt (sodium) in your diet every day.  Call your Health Care Provider if you have any swelling or increased swelling in your feet, ankles, and/or stomach.  Take all of your medication as directed.  If you become dizzy call your Health Care Provider.

## 2017-08-09 NOTE — DISCHARGE NOTE ADULT - ADDITIONAL INSTRUCTIONS
- repeat EUS/ERCP in 4-6 weeks as outpatient for stent removal and further evaluation of pancreas duct. You will need a repeat EUS/ERCP in 4-6 weeks as outpatient for stent removal and further evaluation of pancreas duct.  Followup with your Primary care doctor and your Cardiologist. Stop Valsartan for now and discuss with your doctor.

## 2017-08-10 VITALS
SYSTOLIC BLOOD PRESSURE: 98 MMHG | HEART RATE: 100 BPM | TEMPERATURE: 98 F | RESPIRATION RATE: 18 BRPM | DIASTOLIC BLOOD PRESSURE: 59 MMHG | OXYGEN SATURATION: 97 %

## 2017-08-10 LAB
ALBUMIN SERPL ELPH-MCNC: 2.9 G/DL — LOW (ref 3.3–5)
ALP SERPL-CCNC: 189 U/L — HIGH (ref 40–120)
ALT FLD-CCNC: 71 U/L — HIGH (ref 10–45)
ANION GAP SERPL CALC-SCNC: 14 MMOL/L — SIGNIFICANT CHANGE UP (ref 5–17)
AST SERPL-CCNC: 81 U/L — HIGH (ref 10–40)
BILIRUB DIRECT SERPL-MCNC: 4.7 MG/DL — HIGH (ref 0–0.2)
BILIRUB INDIRECT FLD-MCNC: 1.8 MG/DL — HIGH (ref 0.2–1)
BILIRUB SERPL-MCNC: 6.5 MG/DL — HIGH (ref 0.2–1.2)
BUN SERPL-MCNC: 27 MG/DL — HIGH (ref 7–23)
CALCIUM SERPL-MCNC: 7.5 MG/DL — LOW (ref 8.4–10.5)
CHLORIDE SERPL-SCNC: 106 MMOL/L — SIGNIFICANT CHANGE UP (ref 96–108)
CO2 SERPL-SCNC: 19 MMOL/L — LOW (ref 22–31)
CREAT SERPL-MCNC: 0.95 MG/DL — SIGNIFICANT CHANGE UP (ref 0.5–1.3)
GLUCOSE SERPL-MCNC: 179 MG/DL — HIGH (ref 70–99)
POTASSIUM SERPL-MCNC: 3.7 MMOL/L — SIGNIFICANT CHANGE UP (ref 3.5–5.3)
POTASSIUM SERPL-SCNC: 3.7 MMOL/L — SIGNIFICANT CHANGE UP (ref 3.5–5.3)
PROT SERPL-MCNC: 6.3 G/DL — SIGNIFICANT CHANGE UP (ref 6–8.3)
SODIUM SERPL-SCNC: 139 MMOL/L — SIGNIFICANT CHANGE UP (ref 135–145)

## 2017-08-10 RX ORDER — METOPROLOL TARTRATE 50 MG
1 TABLET ORAL
Qty: 28 | Refills: 0 | OUTPATIENT
Start: 2017-08-10 | End: 2017-08-24

## 2017-08-10 RX ORDER — DABIGATRAN ETEXILATE MESYLATE 150 MG/1
1 CAPSULE ORAL
Qty: 60 | Refills: 0 | OUTPATIENT
Start: 2017-08-10 | End: 2017-09-09

## 2017-08-10 RX ORDER — FUROSEMIDE 40 MG
1 TABLET ORAL
Qty: 14 | Refills: 0 | OUTPATIENT
Start: 2017-08-10 | End: 2017-08-24

## 2017-08-10 RX ORDER — DABIGATRAN ETEXILATE MESYLATE 150 MG/1
75 CAPSULE ORAL EVERY 12 HOURS
Qty: 0 | Refills: 0 | Status: DISCONTINUED | OUTPATIENT
Start: 2017-08-10 | End: 2017-08-10

## 2017-08-10 RX ADMIN — Medication 40 MILLIGRAM(S): at 06:11

## 2017-08-10 RX ADMIN — VALSARTAN 40 MILLIGRAM(S): 80 TABLET ORAL at 06:10

## 2017-08-10 RX ADMIN — GABAPENTIN 600 MILLIGRAM(S): 400 CAPSULE ORAL at 13:15

## 2017-08-10 RX ADMIN — ENOXAPARIN SODIUM 60 MILLIGRAM(S): 100 INJECTION SUBCUTANEOUS at 06:11

## 2017-08-10 RX ADMIN — TOREMIFENE CITRATE 60 MILLIGRAM(S): 60 TABLET ORAL at 13:16

## 2017-08-10 RX ADMIN — Medication 25 MILLIGRAM(S): at 06:11

## 2017-08-10 RX ADMIN — GABAPENTIN 600 MILLIGRAM(S): 400 CAPSULE ORAL at 06:11

## 2017-08-10 NOTE — PROGRESS NOTE ADULT - SUBJECTIVE AND OBJECTIVE BOX
SUBJECTIVE / OVERNIGHT EVENTS: No nausea, vomiting or diarrhea, no fever or chills, no dizziness or chest pain, no dysuria or hematuria .      CAPILLARY BLOOD GLUCOSE        I&O's Summary    09 Aug 2017 07:01  -  10 Aug 2017 07:00  --------------------------------------------------------  IN: 960 mL / OUT: 550 mL / NET: 410 mL        PHYSICAL EXAM:  HEAD:  Atraumatic, Normocephalic  EYES: EOMI, PERRLA, conjunctiva and sclera clear  NECK: Supple, No JVD  CHEST/LUNG: Clear to auscultation bilaterally; No wheeze  HEART: Regular rate and rhythm; No murmurs, rubs, or gallops  ABDOMEN: Soft, Nontender, Nondistended; Bowel sounds present  EXTREMITIES:  2+ Peripheral Pulses, No clubbing, cyanosis, or edema  PSYCH: AAOx3  NEUROLOGY: non-focal  SKIN: No rashes or lesions    MEDICATIONS  (STANDING):  metoprolol 25 milliGRAM(s) Oral two times a day  valsartan 40 milliGRAM(s) Oral daily  gabapentin 600 milliGRAM(s) Oral three times a day  pramipexole 0.25 milliGRAM(s) Oral at bedtime  furosemide   Injectable 40 milliGRAM(s) IV Push daily  toremifene 60 milliGRAM(s) Oral daily  dabigatran 75 milliGRAM(s) Oral every 12 hours    MEDICATIONS  (PRN):      LABS:                        9.0    5.61  )-----------( 163      ( 09 Aug 2017 08:00 )             28.8     08-10    139  |  106  |  27<H>  ----------------------------<  179<H>  3.7   |  19<L>  |  0.95    Ca    7.5<L>      10 Aug 2017 07:39    TPro  6.3  /  Alb  2.9<L>  /  TBili  6.5<H>  /  DBili  4.7<H>  /  AST  81<H>  /  ALT  71<H>  /  AlkPhos  189<H>  08-10                    Thyroid Stimulating Hormone, Serum: 3.12 uIU/mL (08-06 @ 09:02)      Cultures:    EKG:    Radiological Studies:    Consultant(s) Notes Reviewed:      Care Discussed with Consultants/Other Providers:

## 2017-08-10 NOTE — PROGRESS NOTE ADULT - ASSESSMENT
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 s/p ERCP with sphincterotomy and fully covered metal stent. Clinically stable. LFTs improving.     PLAN:  - diet as tolerated  - monitor LFTs  - resume pradaxa   - repeat EUS/ERCP in 4-6 weeks as outpatient (followup with Dr. Pedro Mendenhall) for stent removal and further evaluation of pancreas duct.   - no contra-indication to discharge from GI standpoint.

## 2017-08-10 NOTE — PROGRESS NOTE ADULT - SUBJECTIVE AND OBJECTIVE BOX
- Patient seen and examined.  - In summary, patient is a 88y year old woman who presented with jaundice (05 Aug 2017 18:30)  - Today, patient is without complaints.         *****MEDICATIONS:    MEDICATIONS  (STANDING):  metoprolol 25 milliGRAM(s) Oral two times a day  valsartan 40 milliGRAM(s) Oral daily  gabapentin 600 milliGRAM(s) Oral three times a day  pramipexole 0.25 milliGRAM(s) Oral at bedtime  furosemide   Injectable 40 milliGRAM(s) IV Push daily  toremifene 60 milliGRAM(s) Oral daily  dabigatran 75 milliGRAM(s) Oral every 12 hours    MEDICATIONS  (PRN):           ***** REVIEW OF SYSTEM:  GEN: no fever, no chills, no pain  RESP: no SOB, no cough, no sputum  CVS: no chest pain, no palpitations, no edema  GI: no abdominal pain, no nausea, no vomiting, no constipation, no diarrhea  : no dysurea, no frequency  NEURO: no headache, no diziness  PSYCH: no depression, not anxious  Derm : no itching, no rash         ***** VITAL SIGNS:    T(F): 97 (08-10-17 @ 05:08), Max: 98.8 (17 @ 13:21)  HR: 92 (08-10-17 @ 05:08) (82 - 98)  BP: 111/72 (08-10-17 @ 05:08) (98/67 - 128/79)  RR: 18 (08-10-17 @ 05:08) (18 - 18)  SpO2: 96% (08-10-17 @ 05:08) (95% - 99%)  Wt(kg): --  ,   I&O's Summary    09 Aug 2017 07:01  -  10 Aug 2017 07:00  --------------------------------------------------------  IN: 960 mL / OUT: 550 mL / NET: 410 mL                 *****PHYSICAL EXAM:  GEN: A&O X 3 , NAD , comfortable  HEENT: NCAT, EOMI, MMM, no icterus  NECK: Supple, No JVD  CVS: S1S2 , regular , No M/R/G appreciated  PULM: CTA B/L,  no W/R/R appreciated  ABD.: soft. non tender, non distended,  bowel sounds present  Extrem: intact pulses , no edema noted  Derm: No rash or ecchymosis noted  PSYCH: normal mood, no depression, not anxious         *****LAB AND IMAGIN.0    5.61  )-----------( 163      ( 09 Aug 2017 08:00 )             28.8               08-10    139  |  106  |  27<H>  ----------------------------<  179<H>  3.7   |  19<L>  |  0.95    Ca    7.5<L>      10 Aug 2017 07:39    TPro  6.3  /  Alb  2.9<L>  /  TBili  6.5<H>  /  DBili  4.7<H>  /  AST  81<H>  /  ALT  71<H>  /  AlkPhos  189<H>  08-10      [All pertinent recent Imaging/Reports reviewed]    < from: Transthoracic Echocardiogram (17 @ 14:23) >  Conclusions:  1. Mitral annular calcification. Tethered mitral valve  leaflets. Moderate-severe mitral regurgitation. Mean  transmitral valve gradient equals 2-3 mm Hg, consistent  with mild mitral stenosis.  2. Aortic valve not well visualized; appears calcified.  Minimal aortic regurgitation.  3. Endocardium not well visualized; grossly mild global  left ventricular systolic dysfunction. There is a false  tendon in the LV cavity (normal variant). Septal flattening  consistent with right ventricular overload.  4. Right ventricular enlargement with decreased right  ventricular systolic function. A device wire is noted in  the right heart.  5. Tricuspid annular dilataton with normal leaflet opening.  Severe tricuspid regurgitation.  6. Estimated pulmonary artery systolic pressure equals 57  mm Hg, assuming right atrial pressure equals 8 mm Hg,  consistent with moderate pulmonary pressures.    < end of copied text >         *****A S S E S S M E N T   A N D   P L A N :    88F CHF, AF, jaundice  GI f/u noted  AC as able  TTE as noted  keep O=I  cont current tx  DCP    __________________________  JIMENEZ Red D.O.

## 2017-08-10 NOTE — PROGRESS NOTE ADULT - ASSESSMENT
MARKED  DECREASE  IN  JAUNDICE    PER  GI,  F/P  WITH  GI  A SOP,  FOR  EVENTUAL  BILAIRY STENT  REMOVAL, PATH  PENDING, PT  WANTS    TO  GO HOME  TODAY

## 2017-08-10 NOTE — PROGRESS NOTE ADULT - SUBJECTIVE AND OBJECTIVE BOX
Chief Complaint:  Patient is a 88y old  Female who presents with a chief complaint of jaundice (09 Aug 2017 15:31)      Interval Events: Afebrile. No abdominal pain/nausea/vomiting. (+) BM - light brown, not oily.     Allergies:  penicillins (Rash)  shellfish (Rash)      Home Medications:    Hospital Medications:  metoprolol 25 milliGRAM(s) Oral two times a day  valsartan 40 milliGRAM(s) Oral daily  gabapentin 600 milliGRAM(s) Oral three times a day  pramipexole 0.25 milliGRAM(s) Oral at bedtime  HYDROmorphone  Injectable 0.5 milliGRAM(s) IV Push three times a day PRN  enoxaparin Injectable 60 milliGRAM(s) SubCutaneous two times a day  furosemide   Injectable 40 milliGRAM(s) IV Push daily  toremifene 60 milliGRAM(s) Oral daily      PMHX/PSHX:      Family history:      ROS: as per HPI       PHYSICAL EXAM:   Vital Signs:  Vital Signs Last 24 Hrs  T(C): 36.1 (10 Aug 2017 05:08), Max: 37.1 (09 Aug 2017 13:21)  T(F): 97 (10 Aug 2017 05:08), Max: 98.8 (09 Aug 2017 13:21)  HR: 92 (10 Aug 2017 05:08) (82 - 98)  BP: 111/72 (10 Aug 2017 05:08) (98/67 - 128/79)  BP(mean): --  RR: 18 (10 Aug 2017 05:08) (18 - 18)  SpO2: 96% (10 Aug 2017 05:08) (95% - 99%)  Daily     Daily Weight in k.1 (10 Aug 2017 05:08)    GENERAL:  NAD  HEENT: sclera -anicteric  CHEST:  breath sounds clear  HEART:  Regular rhythm  ABDOMEN:  Soft, non-tender, non-distended, normoactive bowel sounds  SKIN:  improving jaundice   NEURO:  Alert, oriented    LABS:                        9.0    5.61  )-----------( 163      ( 09 Aug 2017 08:00 )             28.8     08-09    145  |  113<H>  |  27<H>  ----------------------------<  147<H>  4.2   |  15<L>  |  0.99    Ca    7.4<L>      09 Aug 2017 08:00    TPro  5.9<L>  /  Alb  2.7<L>  /  TBili  9.2<H>  /  DBili  x   /  AST  128<H>  /  ALT  82<H>  /  AlkPhos  190<H>  08    LIVER FUNCTIONS - ( 09 Aug 2017 08:00 )  Alb: 2.7 g/dL / Pro: 5.9 g/dL / ALK PHOS: 190 U/L / ALT: 82 U/L / AST: 128 U/L / GGT: x                   Imaging:

## 2017-10-19 PROCEDURE — 74183 MRI ABD W/O CNTR FLWD CNTR: CPT

## 2017-10-19 PROCEDURE — 83550 IRON BINDING TEST: CPT

## 2017-10-19 PROCEDURE — 86900 BLOOD TYPING SEROLOGIC ABO: CPT

## 2017-10-19 PROCEDURE — 82728 ASSAY OF FERRITIN: CPT

## 2017-10-19 PROCEDURE — 80053 COMPREHEN METABOLIC PANEL: CPT

## 2017-10-19 PROCEDURE — 85027 COMPLETE CBC AUTOMATED: CPT

## 2017-10-19 PROCEDURE — 86880 COOMBS TEST DIRECT: CPT

## 2017-10-19 PROCEDURE — 85045 AUTOMATED RETICULOCYTE COUNT: CPT

## 2017-10-19 PROCEDURE — C1874: CPT

## 2017-10-19 PROCEDURE — 86902 BLOOD TYPE ANTIGEN DONOR EA: CPT

## 2017-10-19 PROCEDURE — 84155 ASSAY OF PROTEIN SERUM: CPT

## 2017-10-19 PROCEDURE — 84443 ASSAY THYROID STIM HORMONE: CPT

## 2017-10-19 PROCEDURE — 86905 BLOOD TYPING RBC ANTIGENS: CPT

## 2017-10-19 PROCEDURE — 36430 TRANSFUSION BLD/BLD COMPNT: CPT

## 2017-10-19 PROCEDURE — 86300 IMMUNOASSAY TUMOR CA 15-3: CPT

## 2017-10-19 PROCEDURE — 87086 URINE CULTURE/COLONY COUNT: CPT

## 2017-10-19 PROCEDURE — 84165 PROTEIN E-PHORESIS SERUM: CPT

## 2017-10-19 PROCEDURE — 80076 HEPATIC FUNCTION PANEL: CPT

## 2017-10-19 PROCEDURE — 86870 RBC ANTIBODY IDENTIFICATION: CPT

## 2017-10-19 PROCEDURE — 80048 BASIC METABOLIC PNL TOTAL CA: CPT

## 2017-10-19 PROCEDURE — 83880 ASSAY OF NATRIURETIC PEPTIDE: CPT

## 2017-10-19 PROCEDURE — 81001 URINALYSIS AUTO W/SCOPE: CPT

## 2017-10-19 PROCEDURE — 93306 TTE W/DOPPLER COMPLETE: CPT

## 2017-10-19 PROCEDURE — 82248 BILIRUBIN DIRECT: CPT

## 2017-10-19 PROCEDURE — 86850 RBC ANTIBODY SCREEN: CPT

## 2017-10-19 PROCEDURE — 86334 IMMUNOFIX E-PHORESIS SERUM: CPT

## 2017-10-19 PROCEDURE — 99285 EMERGENCY DEPT VISIT HI MDM: CPT

## 2017-10-19 PROCEDURE — 86901 BLOOD TYPING SEROLOGIC RH(D): CPT

## 2017-10-19 PROCEDURE — P9040: CPT

## 2017-10-19 PROCEDURE — 83615 LACTATE (LD) (LDH) ENZYME: CPT

## 2017-10-19 PROCEDURE — 97161 PT EVAL LOW COMPLEX 20 MIN: CPT

## 2017-10-19 PROCEDURE — 82378 CARCINOEMBRYONIC ANTIGEN: CPT

## 2017-10-19 PROCEDURE — C1769: CPT

## 2017-10-19 PROCEDURE — 86922 COMPATIBILITY TEST ANTIGLOB: CPT

## 2017-10-19 PROCEDURE — 83521 IG LIGHT CHAINS FREE EACH: CPT

## 2017-10-19 PROCEDURE — 82607 VITAMIN B-12: CPT

## 2017-10-25 RX ORDER — FUROSEMIDE 40 MG
1 TABLET ORAL
Qty: 0 | Refills: 0 | COMMUNITY

## 2022-03-31 NOTE — PHYSICAL THERAPY INITIAL EVALUATION ADULT - IMPAIRMENTS CONTRIBUTING IMPAIRED BED MOBILITY, REHAB EVAL
normal... Well appearing, awake, alert, oriented to person, place, time/situation and in no apparent distress. decreased strength/impaired balance/decreased flexibility

## 2022-06-12 NOTE — PATIENT PROFILE ADULT. - BLOOD TRANSFUSION, PREVIOUS, PROFILE
Problem: Skin/Tissue Integrity - Adult  Goal: Incisions, wounds, or drain sites healing without S/S of infection  6/12/2022 0247 by Tata Palacio RN  Outcome: Progressing  Flowsheets (Taken 6/12/2022 0247)  Incisions, Wounds, or Drain Sites Healing Without Sign and Symptoms of Infection:   TWICE DAILY: Assess and document skin integrity   TWICE DAILY: Assess and document dressing/incision, wound bed, drain sites and surrounding tissue  Note: Pt's dressing on R breast is clean, dry, and intact. Pt's surgical sites are clean, dry, and intact. Pt did have a fever of 101.6. Tylenol given and temperature recheck was 98.6. Problem: Pain  Goal: Verbalizes/displays adequate comfort level or baseline comfort level  6/12/2022 0244 by Tata Palacio RN  Outcome: Progressing  Flowsheets (Taken 6/12/2022 0243)  Verbalizes/displays adequate comfort level or baseline comfort level:   Encourage patient to monitor pain and request assistance   Assess pain using appropriate pain scale   Administer analgesics based on type and severity of pain and evaluate response   Implement non-pharmacological measures as appropriate and evaluate response  Note: Pt had 8/10 aching pain in bilateral legs. Pain medication given per MD order. Will continue to monitor. Problem: Hematologic - Adult  Goal: Maintains hematologic stability  6/12/2022 0257 by Tata Palacio RN  Flowsheets (Taken 6/12/2022 0257)  Maintains hematologic stability:   Monitor labs for bleeding or clotting disorders   Assess for signs and symptoms of bleeding or hemorrhage  Note: Pt is showing no s/s of bleeding or hemorrhage at this time. BP stable. ST on telemetry. Monitoring vitals and labs. Problem: Safety - Adult  Goal: Free from fall injury  6/12/2022 0244 by Tata Palacio RN  Outcome: Progressing  Flowsheets (Taken 6/12/2022 0244)  Free From Fall Injury: Instruct family/caregiver on patient safety  Note: Fall precautions are in place.  Bed alarm is on and in lowest position. Pt is using call light appropriately. Will continue to monitor. yes

## 2023-02-01 NOTE — DISCHARGE NOTE ADULT - NSFTFSTATUSABRD_GEN_ALL_CORE
PATIENT NEEDS ASSISTANCE TO LEAVE RESIDENCE: Zoryve Pregnancy And Lactation Text: It is unknown if this medication can cause problems during pregnancy and breastfeeding.

## 2023-10-13 NOTE — CONSULT NOTE ADULT - CONSULT REASON
Jaundice
UTI
a.fib/cardiac clearance for possible ercp
Jaundice, h/o lymphoma, MGUS and breast cancer
no